# Patient Record
Sex: FEMALE | Race: BLACK OR AFRICAN AMERICAN | Employment: OTHER | URBAN - METROPOLITAN AREA
[De-identification: names, ages, dates, MRNs, and addresses within clinical notes are randomized per-mention and may not be internally consistent; named-entity substitution may affect disease eponyms.]

---

## 2019-08-11 ENCOUNTER — OFFICE VISIT (OUTPATIENT)
Dept: GERIATRIC MEDICINE | Age: 44
End: 2019-08-11
Payer: COMMERCIAL

## 2019-08-11 DIAGNOSIS — M54.50 CHRONIC LOW BACK PAIN WITHOUT SCIATICA, UNSPECIFIED BACK PAIN LATERALITY: Primary | ICD-10-CM

## 2019-08-11 DIAGNOSIS — E03.9 HYPOTHYROIDISM, UNSPECIFIED TYPE: ICD-10-CM

## 2019-08-11 DIAGNOSIS — I10 ESSENTIAL HYPERTENSION: ICD-10-CM

## 2019-08-11 DIAGNOSIS — G89.29 CHRONIC LOW BACK PAIN WITHOUT SCIATICA, UNSPECIFIED BACK PAIN LATERALITY: Primary | ICD-10-CM

## 2019-08-11 DIAGNOSIS — F32.A DEPRESSION, UNSPECIFIED DEPRESSION TYPE: ICD-10-CM

## 2019-08-11 DIAGNOSIS — F41.9 ANXIETY: ICD-10-CM

## 2019-08-11 PROCEDURE — 99306 1ST NF CARE HIGH MDM 50: CPT | Performed by: INTERNAL MEDICINE

## 2019-08-13 ENCOUNTER — OFFICE VISIT (OUTPATIENT)
Dept: GERIATRIC MEDICINE | Age: 44
End: 2019-08-13
Payer: COMMERCIAL

## 2019-08-13 DIAGNOSIS — E03.9 HYPOTHYROIDISM, UNSPECIFIED TYPE: ICD-10-CM

## 2019-08-13 DIAGNOSIS — I10 ESSENTIAL HYPERTENSION: ICD-10-CM

## 2019-08-13 DIAGNOSIS — F32.A DEPRESSION, UNSPECIFIED DEPRESSION TYPE: ICD-10-CM

## 2019-08-13 DIAGNOSIS — G89.29 CHRONIC LOW BACK PAIN WITHOUT SCIATICA, UNSPECIFIED BACK PAIN LATERALITY: Primary | ICD-10-CM

## 2019-08-13 DIAGNOSIS — M54.50 CHRONIC LOW BACK PAIN WITHOUT SCIATICA, UNSPECIFIED BACK PAIN LATERALITY: Primary | ICD-10-CM

## 2019-08-13 DIAGNOSIS — F41.9 ANXIETY: ICD-10-CM

## 2019-08-13 PROCEDURE — 99309 SBSQ NF CARE MODERATE MDM 30: CPT | Performed by: INTERNAL MEDICINE

## 2019-08-19 RX ORDER — OXYCODONE HCL 10 MG/1
10 TABLET, FILM COATED, EXTENDED RELEASE ORAL EVERY 12 HOURS
OUTPATIENT
Start: 2019-08-19

## 2019-08-19 RX ORDER — OXYCODONE HCL 10 MG/1
10 TABLET, FILM COATED, EXTENDED RELEASE ORAL EVERY 12 HOURS
Status: ON HOLD | COMMUNITY
End: 2020-07-04 | Stop reason: ALTCHOICE

## 2019-09-25 ASSESSMENT — ENCOUNTER SYMPTOMS
EYE REDNESS: 0
RHINORRHEA: 0
NAUSEA: 0
NAUSEA: 0
TROUBLE SWALLOWING: 0
COLOR CHANGE: 0
COUGH: 0
EYE PAIN: 0
ABDOMINAL PAIN: 0
EYE DISCHARGE: 0
BLOOD IN STOOL: 0
FACIAL SWELLING: 0
COUGH: 0
SORE THROAT: 0
APNEA: 0
EYE DISCHARGE: 0
WHEEZING: 0
EYE REDNESS: 0
PHOTOPHOBIA: 0
DIARRHEA: 0
PHOTOPHOBIA: 0
SORE THROAT: 0
BACK PAIN: 0
VOMITING: 0
COLOR CHANGE: 0
TROUBLE SWALLOWING: 0
VOICE CHANGE: 0
BACK PAIN: 0
SINUS PRESSURE: 0
APNEA: 0
RHINORRHEA: 0
EYE ITCHING: 0
DIARRHEA: 0
ABDOMINAL DISTENTION: 0
EYE ITCHING: 0
CONSTIPATION: 0
SINUS PRESSURE: 0
EYE PAIN: 0
CHEST TIGHTNESS: 0
FACIAL SWELLING: 0
SINUS PAIN: 0
WHEEZING: 0
RECTAL PAIN: 0
SINUS PAIN: 0
ABDOMINAL PAIN: 0
VOICE CHANGE: 0
VOMITING: 0
BLOOD IN STOOL: 0
CHEST TIGHTNESS: 0
SHORTNESS OF BREATH: 0
CONSTIPATION: 0
SHORTNESS OF BREATH: 0
RECTAL PAIN: 0
ABDOMINAL DISTENTION: 0

## 2019-09-26 NOTE — PROGRESS NOTES
appears well-developed and well-nourished. No distress. HENT:   Head: Normocephalic. Right Ear: External ear normal.   Left Ear: External ear normal.   Eyes: Conjunctivae are normal.   Neck: Neck supple. No tracheal deviation present. Cardiovascular: Normal rate, regular rhythm and normal heart sounds. Pulmonary/Chest: Effort normal and breath sounds normal. No respiratory distress. She has no wheezes. She has no rales. Abdominal: Soft. Bowel sounds are normal. She exhibits no distension and no mass. There is no tenderness. There is no guarding. Musculoskeletal: She exhibits no edema, tenderness or deformity. Neurological: She is alert and oriented to person, place, and time. Bilateral lower extremity weakness 2/5. Skin: Skin is warm and dry. No rash noted. No erythema. No pallor. Psychiatric: She has a normal mood and affect. Judgment and thought content normal.       Assessment:       Diagnosis Orders   1. Chronic low back pain without sciatica, unspecified back pain laterality     2. Essential hypertension     3. Hypothyroidism, unspecified type     4. Depression, unspecified depression type     5. Anxiety           Plan:      Loly Ellsworth was seen today for back pain. Diagnoses and all orders for this visit:    Chronic low back pain without sciatica, unspecified back pain laterality-continue analgesics as described previously. The patient has been referred to pain management. Essential hypertension continue propranolol 20 mg orally 3 times daily. Hypothyroidism, unspecified type continue Synthroid as described previously. Depression, unspecified depression type    Anxiety-continue venlafaxine 150 mg orally daily. Return in about 1 week (around 8/20/2019).       Shukri Wyman MD    Please note, this report has been partially produced using speech recognition software  and may cause  and /or contain errors related to that system including grammar, punctuation and spelling as well as words and phrases that may seem inappropriate. If there are questions or concerns please feel free to contact me to clarify.

## 2019-09-26 NOTE — PROGRESS NOTES
Subjective:      Patient ID: Nadira Arellano is a 44 y.o. female who presents today for:  Chief Complaint   Patient presents with    Back Pain       HPI  41-year-old female with a history of chronic back pain, hypertension, hypothyroidism, depression and anxiety presents to receive nursing home care in a skilled nursing home setting. She reports severe lumbar back pain which is further characterizes sharp constant nonradiating with associated bilateral lower extremity weakness which is also chronic. She states that her pain is aggravated by movement. She reports that her back pain is relieved by opioid analgesics. At present he denies polyuria,  Polydipsia, constitutional, sinus, visual, cardio pulmonary, gastrointestinal, immunological, musculoskeletal, neurologic, hematologic, or psychiatric complaints. No past medical history on file. No past surgical history on file.   Social History     Socioeconomic History    Marital status:      Spouse name: Not on file    Number of children: Not on file    Years of education: Not on file    Highest education level: Not on file   Occupational History    Not on file   Social Needs    Financial resource strain: Not on file    Food insecurity:     Worry: Not on file     Inability: Not on file    Transportation needs:     Medical: Not on file     Non-medical: Not on file   Tobacco Use    Smoking status: Not on file   Substance and Sexual Activity    Alcohol use: Not on file    Drug use: Not on file    Sexual activity: Not on file   Lifestyle    Physical activity:     Days per week: Not on file     Minutes per session: Not on file    Stress: Not on file   Relationships    Social connections:     Talks on phone: Not on file     Gets together: Not on file     Attends Buddhism service: Not on file     Active member of club or organization: Not on file     Attends meetings of clubs or organizations: Not on file     Relationship status: Not on file   [facility]

## 2019-10-14 ENCOUNTER — APPOINTMENT (OUTPATIENT)
Dept: GENERAL RADIOLOGY | Age: 44
End: 2019-10-14
Payer: COMMERCIAL

## 2019-10-14 ENCOUNTER — HOSPITAL ENCOUNTER (EMERGENCY)
Age: 44
Discharge: HOME | End: 2019-10-14
Attending: EMERGENCY MEDICINE
Payer: COMMERCIAL

## 2019-10-14 VITALS
SYSTOLIC BLOOD PRESSURE: 121 MMHG | DIASTOLIC BLOOD PRESSURE: 107 MMHG | WEIGHT: 172 LBS | OXYGEN SATURATION: 100 % | HEART RATE: 82 BPM | RESPIRATION RATE: 16 BRPM | HEIGHT: 63 IN | BODY MASS INDEX: 30.48 KG/M2 | TEMPERATURE: 97.8 F

## 2019-10-14 DIAGNOSIS — S69.91XA INJURY OF FINGER OF RIGHT HAND, INITIAL ENCOUNTER: Primary | ICD-10-CM

## 2019-10-14 PROCEDURE — 73140 X-RAY EXAM OF FINGER(S): CPT

## 2019-10-14 PROCEDURE — 99283 EMERGENCY DEPT VISIT LOW MDM: CPT

## 2019-10-14 RX ORDER — HYDROCODONE BITARTRATE AND ACETAMINOPHEN 5; 325 MG/1; MG/1
1 TABLET ORAL ONCE
Status: DISCONTINUED | OUTPATIENT
Start: 2019-10-14 | End: 2019-10-14

## 2019-10-14 ASSESSMENT — PAIN SCALES - GENERAL
PAINLEVEL_OUTOF10: 10
PAINLEVEL_OUTOF10: 10

## 2020-07-03 ENCOUNTER — HOSPITAL ENCOUNTER (INPATIENT)
Age: 45
LOS: 4 days | Discharge: HOME | DRG: 753 | End: 2020-07-08
Attending: PSYCHIATRY & NEUROLOGY | Admitting: PSYCHIATRY & NEUROLOGY
Payer: COMMERCIAL

## 2020-07-03 LAB
ACETAMINOPHEN LEVEL: <5 UG/ML (ref 10–30)
ALBUMIN SERPL-MCNC: 4.4 G/DL (ref 3.5–4.6)
ALP BLD-CCNC: 39 U/L (ref 40–130)
ALT SERPL-CCNC: 18 U/L (ref 0–33)
AMPHETAMINE SCREEN, URINE: ABNORMAL
ANION GAP SERPL CALCULATED.3IONS-SCNC: 11 MEQ/L (ref 9–15)
AST SERPL-CCNC: 13 U/L (ref 0–35)
BARBITURATE SCREEN URINE: ABNORMAL
BASOPHILS ABSOLUTE: 0 K/UL (ref 0–0.2)
BASOPHILS RELATIVE PERCENT: 0.5 %
BENZODIAZEPINE SCREEN, URINE: ABNORMAL
BILIRUB SERPL-MCNC: <0.2 MG/DL (ref 0.2–0.7)
BILIRUBIN URINE: NEGATIVE
BLOOD, URINE: NEGATIVE
BUN BLDV-MCNC: 11 MG/DL (ref 6–20)
CALCIUM SERPL-MCNC: 8.9 MG/DL (ref 8.5–9.9)
CANNABINOID SCREEN URINE: ABNORMAL
CHLORIDE BLD-SCNC: 102 MEQ/L (ref 95–107)
CHOLESTEROL, TOTAL: 170 MG/DL (ref 0–199)
CLARITY: CLEAR
CO2: 24 MEQ/L (ref 20–31)
COCAINE METABOLITE SCREEN URINE: POSITIVE
COLOR: YELLOW
CREAT SERPL-MCNC: 0.95 MG/DL (ref 0.5–0.9)
EOSINOPHILS ABSOLUTE: 0.4 K/UL (ref 0–0.7)
EOSINOPHILS RELATIVE PERCENT: 4.6 %
ETHANOL PERCENT: 0.16 G/DL
ETHANOL: 181 MG/DL (ref 0–0.08)
GFR AFRICAN AMERICAN: >60
GFR NON-AFRICAN AMERICAN: >60
GLOBULIN: 3.2 G/DL (ref 2.3–3.5)
GLUCOSE BLD-MCNC: 110 MG/DL (ref 70–99)
GLUCOSE URINE: NEGATIVE MG/DL
HCG(URINE) PREGNANCY TEST: NEGATIVE
HCT VFR BLD CALC: 37.5 % (ref 37–47)
HDLC SERPL-MCNC: 68 MG/DL (ref 40–59)
HEMOGLOBIN: 12.6 G/DL (ref 12–16)
KETONES, URINE: NEGATIVE MG/DL
LDL CHOLESTEROL CALCULATED: 82 MG/DL (ref 0–129)
LEUKOCYTE ESTERASE, URINE: NEGATIVE
LYMPHOCYTES ABSOLUTE: 2.5 K/UL (ref 1–4.8)
LYMPHOCYTES RELATIVE PERCENT: 31.6 %
Lab: ABNORMAL
MCH RBC QN AUTO: 28.1 PG (ref 27–31.3)
MCHC RBC AUTO-ENTMCNC: 33.6 % (ref 33–37)
MCV RBC AUTO: 83.5 FL (ref 82–100)
METHADONE SCREEN, URINE: ABNORMAL
MONOCYTES ABSOLUTE: 0.8 K/UL (ref 0.2–0.8)
MONOCYTES RELATIVE PERCENT: 9.6 %
NEUTROPHILS ABSOLUTE: 4.2 K/UL (ref 1.4–6.5)
NEUTROPHILS RELATIVE PERCENT: 53.7 %
NITRITE, URINE: NEGATIVE
OPIATE SCREEN URINE: ABNORMAL
OXYCODONE URINE: ABNORMAL
PDW BLD-RTO: 14.7 % (ref 11.5–14.5)
PH UA: 6.5 (ref 5–9)
PHENCYCLIDINE SCREEN URINE: ABNORMAL
PLATELET # BLD: 367 K/UL (ref 130–400)
POTASSIUM SERPL-SCNC: 3.8 MEQ/L (ref 3.4–4.9)
PROPOXYPHENE SCREEN: ABNORMAL
PROTEIN UA: NEGATIVE MG/DL
RBC # BLD: 4.49 M/UL (ref 4.2–5.4)
SALICYLATE, SERUM: <0.3 MG/DL (ref 15–30)
SODIUM BLD-SCNC: 137 MEQ/L (ref 135–144)
SPECIFIC GRAVITY UA: 1 (ref 1–1.03)
TOTAL CK: 66 U/L (ref 0–170)
TOTAL PROTEIN: 7.6 G/DL (ref 6.3–8)
TRIGL SERPL-MCNC: 102 MG/DL (ref 0–150)
TSH SERPL DL<=0.05 MIU/L-ACNC: 4.62 UIU/ML (ref 0.44–3.86)
URINE REFLEX TO CULTURE: NORMAL
UROBILINOGEN, URINE: 0.2 E.U./DL
WBC # BLD: 7.8 K/UL (ref 4.8–10.8)

## 2020-07-03 PROCEDURE — 84443 ASSAY THYROID STIM HORMONE: CPT

## 2020-07-03 PROCEDURE — 80307 DRUG TEST PRSMV CHEM ANLYZR: CPT

## 2020-07-03 PROCEDURE — G0480 DRUG TEST DEF 1-7 CLASSES: HCPCS

## 2020-07-03 PROCEDURE — 81003 URINALYSIS AUTO W/O SCOPE: CPT

## 2020-07-03 PROCEDURE — 85025 COMPLETE CBC W/AUTO DIFF WBC: CPT

## 2020-07-03 PROCEDURE — 80053 COMPREHEN METABOLIC PANEL: CPT

## 2020-07-03 PROCEDURE — 82550 ASSAY OF CK (CPK): CPT

## 2020-07-03 PROCEDURE — 84703 CHORIONIC GONADOTROPIN ASSAY: CPT

## 2020-07-03 PROCEDURE — 80061 LIPID PANEL: CPT

## 2020-07-03 PROCEDURE — 36415 COLL VENOUS BLD VENIPUNCTURE: CPT

## 2020-07-03 PROCEDURE — 99285 EMERGENCY DEPT VISIT HI MDM: CPT

## 2020-07-03 ASSESSMENT — PATIENT HEALTH QUESTIONNAIRE - PHQ9: SUM OF ALL RESPONSES TO PHQ QUESTIONS 1-9: 15

## 2020-07-03 ASSESSMENT — ENCOUNTER SYMPTOMS
ABDOMINAL PAIN: 0
TROUBLE SWALLOWING: 0
EYE PAIN: 0
ALLERGIC/IMMUNOLOGIC NEGATIVE: 1
COLOR CHANGE: 0
APNEA: 0
SHORTNESS OF BREATH: 0

## 2020-07-04 PROBLEM — F32.2 SEVERE MAJOR DEPRESSION WITHOUT PSYCHOTIC FEATURES (HCC): Status: ACTIVE | Noted: 2020-07-04

## 2020-07-04 PROBLEM — F31.60 MIXED BIPOLAR AFFECTIVE DISORDER (HCC): Status: ACTIVE | Noted: 2020-07-04

## 2020-07-04 LAB
ETHANOL PERCENT: 0.07 G/DL
ETHANOL: 81 MG/DL (ref 0–0.08)
SARS-COV-2, NAAT: NOT DETECTED

## 2020-07-04 PROCEDURE — 1240000000 HC EMOTIONAL WELLNESS R&B

## 2020-07-04 PROCEDURE — 6370000000 HC RX 637 (ALT 250 FOR IP): Performed by: EMERGENCY MEDICINE

## 2020-07-04 PROCEDURE — U0002 COVID-19 LAB TEST NON-CDC: HCPCS

## 2020-07-04 PROCEDURE — G0480 DRUG TEST DEF 1-7 CLASSES: HCPCS

## 2020-07-04 PROCEDURE — 6370000000 HC RX 637 (ALT 250 FOR IP): Performed by: PSYCHIATRY & NEUROLOGY

## 2020-07-04 PROCEDURE — 36415 COLL VENOUS BLD VENIPUNCTURE: CPT

## 2020-07-04 PROCEDURE — 6370000000 HC RX 637 (ALT 250 FOR IP): Performed by: HOSPITALIST

## 2020-07-04 RX ORDER — VENLAFAXINE 75 MG/1
75 TABLET ORAL DAILY
Status: ON HOLD | COMMUNITY
End: 2020-07-08 | Stop reason: HOSPADM

## 2020-07-04 RX ORDER — ZOLPIDEM TARTRATE 10 MG/1
10 TABLET ORAL NIGHTLY PRN
COMMUNITY

## 2020-07-04 RX ORDER — IBUPROFEN 800 MG/1
800 TABLET ORAL 2 TIMES DAILY
Status: ON HOLD | COMMUNITY
End: 2020-07-08 | Stop reason: HOSPADM

## 2020-07-04 RX ORDER — LEVOTHYROXINE SODIUM 137 UG/1
150 TABLET ORAL
COMMUNITY

## 2020-07-04 RX ORDER — BENZTROPINE MESYLATE 1 MG/ML
2 INJECTION INTRAMUSCULAR; INTRAVENOUS 2 TIMES DAILY PRN
Status: DISCONTINUED | OUTPATIENT
Start: 2020-07-04 | End: 2020-07-08 | Stop reason: HOSPADM

## 2020-07-04 RX ORDER — ACETAMINOPHEN 325 MG/1
650 TABLET ORAL EVERY 4 HOURS PRN
Status: DISCONTINUED | OUTPATIENT
Start: 2020-07-04 | End: 2020-07-08 | Stop reason: HOSPADM

## 2020-07-04 RX ORDER — CYCLOBENZAPRINE HCL 10 MG
10 TABLET ORAL NIGHTLY
COMMUNITY

## 2020-07-04 RX ORDER — POLYETHYLENE GLYCOL 3350 17 G
2 POWDER IN PACKET (EA) ORAL
Status: DISCONTINUED | OUTPATIENT
Start: 2020-07-04 | End: 2020-07-08 | Stop reason: HOSPADM

## 2020-07-04 RX ORDER — CYCLOBENZAPRINE HCL 10 MG
10 TABLET ORAL NIGHTLY
Status: DISCONTINUED | OUTPATIENT
Start: 2020-07-04 | End: 2020-07-08 | Stop reason: HOSPADM

## 2020-07-04 RX ORDER — TRAZODONE HYDROCHLORIDE 50 MG/1
50 TABLET ORAL NIGHTLY PRN
Status: DISCONTINUED | OUTPATIENT
Start: 2020-07-04 | End: 2020-07-07

## 2020-07-04 RX ORDER — CLONAZEPAM 0.5 MG/1
0.5 TABLET ORAL 3 TIMES DAILY
Status: DISCONTINUED | OUTPATIENT
Start: 2020-07-04 | End: 2020-07-08 | Stop reason: HOSPADM

## 2020-07-04 RX ORDER — PROPRANOLOL HYDROCHLORIDE 20 MG/1
20 TABLET ORAL 3 TIMES DAILY
Status: DISCONTINUED | OUTPATIENT
Start: 2020-07-04 | End: 2020-07-08 | Stop reason: HOSPADM

## 2020-07-04 RX ORDER — HALOPERIDOL 5 MG/ML
5 INJECTION INTRAMUSCULAR EVERY 6 HOURS PRN
Status: DISCONTINUED | OUTPATIENT
Start: 2020-07-04 | End: 2020-07-08 | Stop reason: HOSPADM

## 2020-07-04 RX ORDER — ALPRAZOLAM 1 MG/1
2 TABLET ORAL 4 TIMES DAILY
COMMUNITY

## 2020-07-04 RX ORDER — HYDROXYZINE PAMOATE 50 MG/1
50 CAPSULE ORAL EVERY 6 HOURS PRN
Status: DISCONTINUED | OUTPATIENT
Start: 2020-07-04 | End: 2020-07-08 | Stop reason: HOSPADM

## 2020-07-04 RX ORDER — QUETIAPINE FUMARATE 50 MG/1
50 TABLET, FILM COATED ORAL NIGHTLY
Status: DISCONTINUED | OUTPATIENT
Start: 2020-07-04 | End: 2020-07-05

## 2020-07-04 RX ORDER — GABAPENTIN 300 MG/1
300 CAPSULE ORAL 3 TIMES DAILY
COMMUNITY

## 2020-07-04 RX ORDER — IBUPROFEN 800 MG/1
800 TABLET ORAL 2 TIMES DAILY
Status: DISCONTINUED | OUTPATIENT
Start: 2020-07-04 | End: 2020-07-08 | Stop reason: HOSPADM

## 2020-07-04 RX ORDER — SUCRALFATE 1 G/1
1 TABLET ORAL 4 TIMES DAILY
Status: DISCONTINUED | OUTPATIENT
Start: 2020-07-04 | End: 2020-07-08 | Stop reason: HOSPADM

## 2020-07-04 RX ORDER — OXCARBAZEPINE 300 MG/1
600 TABLET, FILM COATED ORAL NIGHTLY
Status: ON HOLD | COMMUNITY
End: 2020-07-08 | Stop reason: HOSPADM

## 2020-07-04 RX ORDER — HYDROXYZINE HYDROCHLORIDE 50 MG/ML
50 INJECTION, SOLUTION INTRAMUSCULAR EVERY 6 HOURS PRN
Status: DISCONTINUED | OUTPATIENT
Start: 2020-07-04 | End: 2020-07-08 | Stop reason: HOSPADM

## 2020-07-04 RX ORDER — SUCRALFATE 1 G/1
1 TABLET ORAL 4 TIMES DAILY
COMMUNITY

## 2020-07-04 RX ORDER — OXCARBAZEPINE 300 MG/1
300 TABLET, FILM COATED ORAL 2 TIMES DAILY
COMMUNITY

## 2020-07-04 RX ORDER — OXCARBAZEPINE 300 MG/1
300 TABLET, FILM COATED ORAL 2 TIMES DAILY
Status: DISCONTINUED | OUTPATIENT
Start: 2020-07-04 | End: 2020-07-08 | Stop reason: HOSPADM

## 2020-07-04 RX ORDER — GABAPENTIN 300 MG/1
300 CAPSULE ORAL 3 TIMES DAILY
Status: DISCONTINUED | OUTPATIENT
Start: 2020-07-04 | End: 2020-07-08 | Stop reason: HOSPADM

## 2020-07-04 RX ORDER — IBUPROFEN 800 MG/1
800 TABLET ORAL ONCE
Status: COMPLETED | OUTPATIENT
Start: 2020-07-04 | End: 2020-07-04

## 2020-07-04 RX ORDER — PROPRANOLOL HYDROCHLORIDE 20 MG/1
20 TABLET ORAL 3 TIMES DAILY
COMMUNITY

## 2020-07-04 RX ORDER — OMEPRAZOLE 20 MG/1
40 CAPSULE, DELAYED RELEASE ORAL DAILY
COMMUNITY

## 2020-07-04 RX ORDER — HALOPERIDOL 5 MG
5 TABLET ORAL EVERY 6 HOURS PRN
Status: DISCONTINUED | OUTPATIENT
Start: 2020-07-04 | End: 2020-07-08 | Stop reason: HOSPADM

## 2020-07-04 RX ORDER — PANTOPRAZOLE SODIUM 40 MG/1
40 TABLET, DELAYED RELEASE ORAL
Status: DISCONTINUED | OUTPATIENT
Start: 2020-07-05 | End: 2020-07-08 | Stop reason: HOSPADM

## 2020-07-04 RX ADMIN — IBUPROFEN 800 MG: 800 TABLET ORAL at 21:41

## 2020-07-04 RX ADMIN — TRAZODONE HYDROCHLORIDE 50 MG: 50 TABLET ORAL at 21:06

## 2020-07-04 RX ADMIN — HYDROXYZINE PAMOATE 50 MG: 50 CAPSULE ORAL at 10:33

## 2020-07-04 RX ADMIN — IBUPROFEN 800 MG: 800 TABLET, FILM COATED ORAL at 02:19

## 2020-07-04 RX ADMIN — CYCLOBENZAPRINE 10 MG: 10 TABLET, FILM COATED ORAL at 21:41

## 2020-07-04 RX ADMIN — ACETAMINOPHEN 650 MG: 325 TABLET, FILM COATED ORAL at 17:21

## 2020-07-04 RX ADMIN — CLONAZEPAM 0.5 MG: 0.5 TABLET ORAL at 21:07

## 2020-07-04 RX ADMIN — ACETAMINOPHEN 650 MG: 325 TABLET, FILM COATED ORAL at 10:33

## 2020-07-04 RX ADMIN — GABAPENTIN 300 MG: 300 CAPSULE ORAL at 21:42

## 2020-07-04 RX ADMIN — OXCARBAZEPINE 300 MG: 300 TABLET, FILM COATED ORAL at 21:07

## 2020-07-04 RX ADMIN — PROPRANOLOL HYDROCHLORIDE 20 MG: 20 TABLET ORAL at 21:41

## 2020-07-04 RX ADMIN — HYDROXYZINE PAMOATE 50 MG: 50 CAPSULE ORAL at 17:21

## 2020-07-04 RX ADMIN — SUCRALFATE 1 G: 1 TABLET ORAL at 21:41

## 2020-07-04 ASSESSMENT — SLEEP AND FATIGUE QUESTIONNAIRES
DO YOU USE A SLEEP AID: YES
DIFFICULTY ARISING: NO
SLEEP PATTERN: DISTURBED/INTERRUPTED SLEEP
DIFFICULTY FALLING ASLEEP: YES
DO YOU HAVE DIFFICULTY SLEEPING: YES
DIFFICULTY STAYING ASLEEP: YES
AVERAGE NUMBER OF SLEEP HOURS: 6
RESTFUL SLEEP: NO

## 2020-07-04 ASSESSMENT — PATIENT HEALTH QUESTIONNAIRE - PHQ9: SUM OF ALL RESPONSES TO PHQ QUESTIONS 1-9: 16

## 2020-07-04 ASSESSMENT — PAIN SCALES - GENERAL
PAINLEVEL_OUTOF10: 6
PAINLEVEL_OUTOF10: 5
PAINLEVEL_OUTOF10: 10
PAINLEVEL_OUTOF10: 7
PAINLEVEL_OUTOF10: 10

## 2020-07-04 ASSESSMENT — LIFESTYLE VARIABLES: HISTORY_ALCOHOL_USE: YES

## 2020-07-04 NOTE — GROUP NOTE
Group Therapy Note    Date: 7/4/2020    Group Start Time: 1815  Group End Time: 1905  Group Topic: Recreational    [facility]    Ailyn Jones        Group Therapy Note    Attendees: 7/20       Patient's Goal:  To participate in the [employer] Activity [employer]'s game of [address] [employer]. Notes:  Patient actively participated in the [employer] Activity Group. Status After Intervention:  Improved    Participation Level:  Active Listener and Interactive    Participation Quality: Appropriate, Attentive and Supportive      Speech:  normal      Thought Process/Content: Logical      Affective Functioning: Congruent      Mood: euthymic      Level of consciousness:  Alert and Attentive      Response to Learning: Able to verbalize current knowledge/experience      Endings: None Reported    Modes of Intervention: Activity      Discipline Responsible: [address], [employer]      Signature:  Ailyn Jones
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
## 2020-07-04 NOTE — H&P
[address] [facility]    HISTORY AND PHYSICAL EXAM    PATIENT NAME:  Damaso Ingram    MRN:  [id number]  SERVICE DATE:  7/4/2020   SERVICE TIME:  5:11 PM    Primary Care Physician: Caro Starks  CHIEF COMPLAINT:  Medical clear for inpatient psychiatry admission. Consult for medical H/P encounter. HPI:  This is a 38 y.o. female who is admitted to [address] [place] [address] for worsening symptoms of depression with thoughts of suicidal ideation immediately pta. Denies cp sob ha rash n v d f    PAST MEDICAL HISTORY:  No past medical history on file. PAST SURGICAL HISTORY:  No past surgical history on file. FAMILY HISTORY:  No family history on file.   SOCIAL HISTORY:    Social History     Socioeconomic History    Marital status:      Spouse name: Not on file    Number of children: Not on file    Years of education: Not on file    Highest education level: Not on file   Occupational History    Not on file   Social Needs    Financial resource strain: Not on file    Food insecurity     Worry: Not on file     Inability: Not on file    Transportation needs     Medical: Not on file     Non-medical: Not on file   Tobacco Use    Smoking status: Not on file   Substance and Sexual Activity    Alcohol use: Not Currently    Drug use: Not on file    Sexual activity: Not on file   Lifestyle    Physical activity     Days per week: Not on file     Minutes per session: Not on file    Stress: Not on file   Relationships    Social connections     Talks on phone: Not on file     Gets together: Not on file     Attends Christian service: Not on file     Active member of club or organization: Not on file     Attends meetings of clubs or organizations: Not on file     Relationship status: Not on file    Intimate partner violence     Fear of current or ex partner: Not on file     Emotionally abused: Not on file     Physically abused: Not on file     Forced sexual activity: Not on file Other Topics Concern    Not on file   Social History Narrative    Not on file     MEDICATIONS:    Current Facility-Administered Medications   Medication Dose Route Frequency Provider Last Rate Last Dose    haloperidol lactate (HALDOL) injection 5 mg  5 mg Intramuscular Q6H PRN Renny De León MD        Or    haloperidol (HALDOL) tablet 5 mg  5 mg Oral Q6H PRN Renny De León MD        hydrOXYzine (VISTARIL) capsule 50 mg  50 mg Oral Q6H PRN Renny De León MD   50 mg at 07/04/20 1033    Or    hydrOXYzine (VISTARIL) injection 50 mg  50 mg Intramuscular Q6H PRN Renny De León MD        acetaminophen (TYLENOL) tablet 650 mg  650 mg Oral Q4H PRN Renny De León MD   650 mg at 07/04/20 1033    traZODone (DESYREL) tablet 50 mg  50 mg Oral Nightly PRN Lesly Lopes MD        benztropine mesylate (COGENTIN) injection 2 mg  2 mg Intramuscular BID PRN Renny De León MD        magnesium hydroxide (MILK OF MAGNESIA) 400 MG/5ML suspension 30 mL  30 mL Oral Daily PRN Lesly Lopes MD        nicotine polacrilex (COMMIT) lozenge 2 mg  2 mg Oral Q2H PRN Renny De León MD           ALLERGIES: Meloxicam    REVIEW OF SYSTEM:   ROS as noted in HPI, 12 point ROS reviewed and otherwise negative. OBJECTIVE  PHYSICAL EXAM: /79   Pulse 91   Temp 98.8 °F (37.1 °C) (Oral)   Resp 20   Ht 5' 3\" (1.6 m)   Wt 176 lb (79.8 kg)   SpO2 99%   BMI 31.18 kg/m²   CONSTITUTIONAL:  awake, alert, cooperative, no apparent distress, and appears stated age  EYES:  Lids and lashes normal, pupils equal, round and reactive to light, extra ocular muscles intact, sclera clear, conjunctiva normal  ENT:  Normocephalic, without obvious abnormality, atraumatic, sinuses nontender on palpation, external ears without lesions, oral pharynx with moist mucus membranes, tonsils without erythema or exudates, gums normal and good dentition.   NECK:  Supple, symmetrical, trachea midline, no adenopathy, thyroid symmetric, not enlarged and no tenderness, skin normal  LUNGS:  No increased work of breathing, good air exchange, clear to auscultation bilaterally, no crackles or wheezing  CARDIOVASCULAR:  Normal apical impulse, regular rate and rhythm, normal S1 and S2, no S3 or S4, and no murmur noted  ABDOMEN:  No scars, normal bowel sounds, soft, non-distended, non-tender, no masses palpated, no hepatosplenomegally  MUSCULOSKELETAL:  There is no redness, warmth, or swelling of the joints. Full range of motion noted. Motor strength is 5 out of 5 all extremities bilaterally. Tone is normal.  NEUROLOGIC:  Awake, alert, oriented to name, place and time. Cranial nerves II-XII are grossly intact. Motor is 5 out of 5 bilaterally. Cerebellar finger to nose, heel to shin intact. Sensory is intact. Babinski down going, Romberg negative, and gait is normal.  SKIN:  no bruising or bleeding, normal skin color, texture, turgor, no redness, warmth, or swelling and no jaundice    DATA:     Diagnostic tests reviewed for today's visit:    Most recent labs and imaging results reviewed. VTE Prophylaxis:     ASSESSMENT AND PLAN  Active Problems:    Severe major depression without psychotic features ([address]  Plan:   Suicidal ideation  ETOH abuse  Cocaine abuse  GALA      This is only a history and physical examination and not medical management. The patient is to contact and follow up with their primary care physician and go over any abnormal labs, imaging, findings, medical concerns, or conditions that we have and have not addressed during this encounter.     Plan of care discussed with: patient    SIGNATURE: SAMIRA Beltrán  DATE: July 4, 2020  TIME: 5:11 PM

## 2020-07-04 NOTE — CARE COORDINATION
Patient did not attend psychotherapy group despite encouragement. Patient will be re attempted and encouraged for attendance for next group.
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
## 2020-07-04 NOTE — PROGRESS NOTES
Pt was laying in bed awake and alert. Pt stated she is depressed from all of her medical problems and has chronic pain. Pt states she was drinking alcohol and was pulled over by the police. Pt was medicated with tylenol and vistaril. Pt had pressured speech and appeared anxious during assessment. Pt denies SI,HI,AVH.

## 2020-07-04 NOTE — PROGRESS NOTES
Patient was laying in bed in her room. She was awake and alert. She was anxious, talkative and expressive. Patient stated she is depressed, overwhelmed and stressed. Stressors are she has a lot of medical issues, has chronic pain, misses her kids that live in another state and she had 2 kids that . She was drinking alcohol and got pulled over by then police for an [id number]. Once patient was arrested, she started to say she wanted to kill herself. Patient denies she was suicidal.  She feels tired all the time and has low motivation. She denies any auditory or visual hallucinations. She has no leisure interests at this time. Electronically signed by Rishabh Belcher, [address] on 2020 at 4:08 PM
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
## 2020-07-04 NOTE — PROGRESS NOTES
Report from [facility]  MED CENTER in [facility] AN [facility]FFILIATE OF [facility]-    38year old female presented to ED via [place] [place] [employer]. Police state they had pulled pt over for an STEVIE and once she was arrested in the back of the squad car, she started to say she wanted to kill herself so [employer] brought her in for eval. Patient initially presented intoxicated, and was adamant she would kill herself \"by any means necessary\" if she was discharged.     Once sober upon reassessment, patient denies current SI but states her depression lately has been \"horrible\". She reports feeling constantly depressed, worthless, and unable to focus. She states she feels tired all the time and cant find pleasure in any activities. She reports her main issues are her chronic medical conditions, mostly related to shoulder and back issues. She states the pain isn't managed well.     + cocaine which pt states is from her lidocaine patch. Pt states she isn't a regular drinker, and reports this is the 6th time in 20 years she's been drunk. Pt denies HI or AV hallucinations. Dx Major Depression, voluntary admission, Dr. Kelly Acevedo. Meds need verified with [facility] when opened.

## 2020-07-04 NOTE — ED NOTES
Provisional Diagnosis:    Major Depression    Psychosocial and Contextual Factors:    Not working, on SSI due to medical    C-SSRS Summary:     Patient: C-SSRS Suicide Screening  1) Within the past month, have you wished you were dead or wished you could go to sleep and not wake up? : Yes  2) Have you actually had any thoughts of killing yourself? : No  6) Have you ever done anything, started to do anything, or prepared to do anything to end your life?: No    Family: none    Agency: [facility]          Abuse Assessment  Physical Abuse: Denies  Verbal Abuse: Denies  Emotional abuse: Denies  Financial Abuse: Denies  Sexual abuse: Denies  Elder abuse: No    Clinical Summary:    38year old female presented to ED via [email or web address]. They state they had pulled her over for an STEVIE, and once she was arrested on in the back of the squad car, she started to say she wanted to kill herself, so [employer] brought her in for eval. Patient initially presented intoxicated, and was adamant she would kill herself \"by any means necessary\" if she was discharged. Once sober, on reassessment, patient denies current SI, but states her depression lately has been \"horrible\". She reports feeling constantantly depressed worthless, and unable to focus. She states she feels tired all the time, and cant find pleasure in any activities. She reports her main issues are her chronic medical conditions, mostly related to shoulder and back issues. She states they pain isnt managed well, and she feels like she is having to constantly either at a doctors or in the hospital.    She states she isn't a regular drinker, and reports this is the 6th time in 20 years she's been drunk. She reports no issues with homicidal ideations, or A/V hallucinations, and no history of either.     Level of Care Disposition:      Per Dr Amber Alvarado, RN  07/04/20 0310

## 2020-07-04 NOTE — ED NOTES
Per Dr Vladislav Yeager ok to admit. Day shift to verify meds when [facility] opens. Standard PRNs.      Cornell Ceballos RN  07/04/20 2739

## 2020-07-04 NOTE — PROGRESS NOTES
Pt. refused to attend the 1000 skills group, despite staff encouragement. Electronically signed by Will Mireles, [address] on 7/4/2020 at 11:21 AM

## 2020-07-04 NOTE — ED NOTES
[place] [employer] [place] [address] [place] [address].      Frank Titus RN  07/03/20 2010

## 2020-07-04 NOTE — ED PROVIDER NOTES
[address] [facility] ED  eMERGENCY dEPARTMENTeNCOUnter      Pt Name: Camelia Grigsby  MRN: [id number]  [place] 1975  Date ofevaluation: 7/3/2020  Provider: Eller Kanner, MD    CHIEF COMPLAINT       Chief Complaint   Patient presents with   [facility] Psychiatric Evaluation     pt brought to the ER for a eval         HISTORY OF PRESENT ILLNESS   (Location/Symptom, Timing/Onset,Context/Setting, Quality, Duration, Modifying Factors, Severity)  Note limiting factors. Camelia Grigsby is a 38 y.o. female who presents to the emergency department with complaints of depression and feelings of hopelessness and helplessness, stating that she wished that she could \"go to sleep and never wake up again\". Patient was pulled over by police after consuming 3 shots this evening and voiced her depression and suicidal ideation to the police officers who brought her to the emergency department. Patient denies any plan of suicide. Patient when asked if she has any homicidal ideation, states \"yes towards everyone who ever done the wrong\". Patient denies any recent auditory or visual hallucinations. HPI    NursingNotes were reviewed. REVIEW OF SYSTEMS    (2-9 systems for level 4, 10 or more for level 5)     Review of Systems   Constitutional: Negative for diaphoresis and fever. HENT: Negative for hearing loss and trouble swallowing. Eyes: Negative for pain. Respiratory: Negative for apnea and shortness of breath. Cardiovascular: Negative for chest pain. Gastrointestinal: Negative for abdominal pain. Endocrine: Negative. Genitourinary: Negative for hematuria. Musculoskeletal: Negative for neck pain and neck stiffness. Skin: Negative for color change. Allergic/Immunologic: Negative. Neurological: Negative for dizziness and numbness. Hematological: Negative. Psychiatric/Behavioral: Positive for suicidal ideas. The patient is nervous/anxious.     All other systems reviewed and are negative. Except as noted above the remainder of the review of systems was reviewed and negative. PAST MEDICAL HISTORY   No past medical history on file. SURGICALHISTORY     No past surgical history on file. CURRENT MEDICATIONS       Previous Medications    OXYCODONE (OXYCONTIN) 10 MG EXTENDED RELEASE TABLET    Take 10 mg by mouth every 12 hours. ALLERGIES     Meloxicam    FAMILY HISTORY     No family history on file.        SOCIAL HISTORY       Social History     Socioeconomic History    Marital status:      Spouse name: Not on file    Number of children: Not on file    Years of education: Not on file    Highest education level: Not on file   Occupational History    Not on file   Social Needs    Financial resource strain: Not on file    Food insecurity     Worry: Not on file     Inability: Not on file    Transportation needs     Medical: Not on file     Non-medical: Not on file   Tobacco Use    Smoking status: Not on file   Substance and Sexual Activity    Alcohol use: Not Currently    Drug use: Not on file    Sexual activity: Not on file   Lifestyle    Physical activity     Days per week: Not on file     Minutes per session: Not on file    Stress: Not on file   Relationships    Social connections     Talks on phone: Not on file     Gets together: Not on file     Attends Yazdanism service: Not on file     Active member of club or organization: Not on file     Attends meetings of clubs or organizations: Not on file     Relationship status: Not on file    Intimate partner violence     Fear of current or ex partner: Not on file     Emotionally abused: Not on file     Physically abused: Not on file     Forced sexual activity: Not on file   Other Topics Concern    Not on file   Social History Narrative    Not on file       SCREENINGS              PHYSICAL EXAM    (up to 7 for level 4, 8 or more for level 5)     ED Triage Vitals [07/03/20 1959]   BP Temp Temp Source Pulse Resp SpO2 Height Weight   (!) 143/73 98.1 °F (36.7 °C) Oral 105 20 96 % 5' 3\" (1.6 m) 176 lb (79.8 kg)       Physical Exam  Vitals signs and nursing note reviewed. Constitutional:       General: She is not in acute distress. Appearance: She is well-developed. She is not diaphoretic. HENT:      Head: Normocephalic and atraumatic. Mouth/Throat:      Pharynx: No oropharyngeal exudate. Eyes:      General: No scleral icterus. Conjunctiva/sclera: Conjunctivae normal.      Pupils: Pupils are equal, round, and reactive to light. Neck:      Musculoskeletal: Normal range of motion and neck supple. Trachea: No tracheal deviation. Cardiovascular:      Rate and Rhythm: Normal rate. Heart sounds: Normal heart sounds. Pulmonary:      Effort: Pulmonary effort is normal. No respiratory distress. Breath sounds: Normal breath sounds. Abdominal:      General: Bowel sounds are normal. There is no distension. Palpations: Abdomen is soft. Musculoskeletal: Normal range of motion. Skin:     General: Skin is warm and dry. Findings: No erythema or rash. Neurological:      Mental Status: She is alert and oriented to person, place, and time. Cranial Nerves: No cranial nerve deficit. Motor: No abnormal muscle tone. Psychiatric:         Mood and Affect: Mood is depressed. Affect is tearful. Behavior: Behavior normal.         Thought Content:  Thought content normal.         Judgment: Judgment normal.         DIAGNOSTIC RESULTS     EKG: All EKG's are interpreted by the Emergency Department Physician who either signs or Co-signsthis chart in the absence of a cardiologist.      RADIOLOGY:   Non-plain filmimages such as CT, Ultrasound and MRI are read by the radiologist. Plain radiographic images are visualized and preliminarily interpreted by the emergency physician with the below findings:      Interpretation per the Radiologist below, if available at the time ofthis note:    No orders to display         ED BEDSIDE ULTRASOUND:   Performed by ED Physician - none    LABS:  Labs Reviewed   ACETAMINOPHEN LEVEL - Abnormal; Notable for the following components:       Result Value    Acetaminophen Level <5 (*)     All other components within normal limits   CBC WITH AUTO DIFFERENTIAL - Abnormal; Notable for the following components:    RDW 14.7 (*)     All other components within normal limits   COMPREHENSIVE METABOLIC PANEL - Abnormal; Notable for the following components:    Glucose 110 (*)     CREATININE 0.95 (*)     Alkaline Phosphatase 39 (*)     All other components within normal limits   LIPID PANEL - Abnormal; Notable for the following components:    HDL 68 (*)     All other components within normal limits   SALICYLATE LEVEL - Abnormal; Notable for the following components:    Salicylate, Serum <1.9 (*)     All other components within normal limits   TSH WITHOUT REFLEX - Abnormal; Notable for the following components:    TSH 4.620 (*)     All other components within normal limits   URINE DRUG SCREEN - Abnormal; Notable for the following components:    Cocaine Metabolite Screen, Urine POSITIVE (*)     All other components within normal limits   CK   ETHANOL   PREGNANCY, URINE   URINE RT REFLEX TO CULTURE   COVID-19   ETHANOL       All other labs were within normal range or not returned as of this dictation. EMERGENCY DEPARTMENT COURSE and DIFFERENTIAL DIAGNOSIS/MDM:   Vitals:    Vitals:    07/03/20 1959   BP: (!) 143/73   Pulse: 105   Resp: 20   Temp: 98.1 °F (36.7 °C)   TempSrc: Oral   SpO2: 96%   Weight: 176 lb (79.8 kg)   Height: 5' 3\" (1.6 m)         MDM patient is medically cleared. REASSESSMENT            CONSULTS:  None    PROCEDURES:  Unless otherwise noted below, none     Procedures    FINAL IMPRESSION      1.  Current moderate episode of major depressive disorder without prior episode [facility])          DISPOSITION/PLAN   DISPOSITION Decision To Admit 07/04/2020 05:47:13 AM      PATIENT REFERREDTO:  No follow-up provider specified. DISCHARGEMEDICATIONS:  New Prescriptions    No medications on file     Controlled Substances Monitoring:     No flowsheet data found.     (Please note that portions of this note were completed with a voice recognition program.  Efforts were made to edit the dictations but occasionally words are mis-transcribed.)    Amanda Reece MD (electronically signed)  Attending Emergency Physician          Amanda Reece MD  07/04/20 5209

## 2020-07-04 NOTE — ED NOTES
Report received from [address] [place] [address] . On phone on arrival. Returned to bed. Aware awaiting [address] [facility] [address] bed. Ed [facility]  07/04/20 1941

## 2020-07-04 NOTE — GROUP NOTE
Group Therapy Note    Date: 7/4/2020    Group Start Time: 3830  Group End Time: 1700  Group Topic: Healthy Living/Wellness    [facility] EPIFANIO Viera        Group Therapy Note    Attendees: 9/20       Patient's Goal:  To learn about self-reflection and how to turn negative thinking patterns into positive, constructive thoughts. Notes:  Patient hesitantly participated in the [address] [facility],[address]. Patient stated she \"did not want to be here\" (in group) but knows that she \"needs to go to group in order to get discharged. \" Patient was resistant to participation and minimally participated due to this resistance.     Status After Intervention:  Unchanged    Participation Level: Minimal    Participation Quality: Attentive, Sharing and Resistant      Speech:  normal      Thought Process/Content: Logical      Affective Functioning: Constricted/Restricted      Mood: depressed and irritable      Level of consciousness:  Alert and Attentive      Response to Learning: Resistant      Endings: None Reported    Modes of Intervention: Education      Discipline Responsible: Behavorial Health Tech      Signature:  Blanca Viera

## 2020-07-05 PROCEDURE — 1240000000 HC EMOTIONAL WELLNESS R&B

## 2020-07-05 PROCEDURE — 6370000000 HC RX 637 (ALT 250 FOR IP): Performed by: HOSPITALIST

## 2020-07-05 PROCEDURE — 6370000000 HC RX 637 (ALT 250 FOR IP): Performed by: PSYCHIATRY & NEUROLOGY

## 2020-07-05 RX ORDER — RISPERIDONE 0.5 MG/1
0.5 TABLET, FILM COATED ORAL NIGHTLY
Status: DISCONTINUED | OUTPATIENT
Start: 2020-07-05 | End: 2020-07-06

## 2020-07-05 RX ADMIN — HYDROXYZINE PAMOATE 50 MG: 50 CAPSULE ORAL at 17:24

## 2020-07-05 RX ADMIN — PANTOPRAZOLE SODIUM 40 MG: 40 TABLET, DELAYED RELEASE ORAL at 06:04

## 2020-07-05 RX ADMIN — GABAPENTIN 300 MG: 300 CAPSULE ORAL at 13:51

## 2020-07-05 RX ADMIN — SUCRALFATE 1 G: 1 TABLET ORAL at 21:30

## 2020-07-05 RX ADMIN — OXCARBAZEPINE 300 MG: 300 TABLET, FILM COATED ORAL at 21:31

## 2020-07-05 RX ADMIN — IBUPROFEN 800 MG: 800 TABLET ORAL at 10:03

## 2020-07-05 RX ADMIN — GABAPENTIN 300 MG: 300 CAPSULE ORAL at 10:08

## 2020-07-05 RX ADMIN — GABAPENTIN 300 MG: 300 CAPSULE ORAL at 21:30

## 2020-07-05 RX ADMIN — PROPRANOLOL HYDROCHLORIDE 20 MG: 20 TABLET ORAL at 10:03

## 2020-07-05 RX ADMIN — SUCRALFATE 1 G: 1 TABLET ORAL at 13:51

## 2020-07-05 RX ADMIN — CYCLOBENZAPRINE 10 MG: 10 TABLET, FILM COATED ORAL at 21:30

## 2020-07-05 RX ADMIN — ACETAMINOPHEN 650 MG: 325 TABLET, FILM COATED ORAL at 17:24

## 2020-07-05 RX ADMIN — RISPERIDONE 0.5 MG: 0.5 TABLET ORAL at 21:30

## 2020-07-05 RX ADMIN — CLONAZEPAM 0.5 MG: 0.5 TABLET ORAL at 13:51

## 2020-07-05 RX ADMIN — CLONAZEPAM 0.5 MG: 0.5 TABLET ORAL at 21:31

## 2020-07-05 RX ADMIN — LEVOTHYROXINE SODIUM 137 MCG: 0.03 TABLET ORAL at 06:05

## 2020-07-05 RX ADMIN — PROPRANOLOL HYDROCHLORIDE 20 MG: 20 TABLET ORAL at 21:30

## 2020-07-05 RX ADMIN — SUCRALFATE 1 G: 1 TABLET ORAL at 10:05

## 2020-07-05 RX ADMIN — OXCARBAZEPINE 300 MG: 300 TABLET, FILM COATED ORAL at 10:04

## 2020-07-05 RX ADMIN — CLONAZEPAM 0.5 MG: 0.5 TABLET ORAL at 10:04

## 2020-07-05 RX ADMIN — SUCRALFATE 1 G: 1 TABLET ORAL at 17:24

## 2020-07-05 RX ADMIN — IBUPROFEN 800 MG: 800 TABLET ORAL at 21:31

## 2020-07-05 RX ADMIN — TRAZODONE HYDROCHLORIDE 50 MG: 50 TABLET ORAL at 21:31

## 2020-07-05 ASSESSMENT — PAIN SCALES - GENERAL
PAINLEVEL_OUTOF10: 7
PAINLEVEL_OUTOF10: 8
PAINLEVEL_OUTOF10: 8

## 2020-07-05 NOTE — CARE COORDINATION
Brief Intervention and Referral to Treatment Summary    Patient was provided PHQ-9, AUDIT and DAST Screening:      PHQ-9 Score: 16  AUDIT Score: 0   DAST Score: 0     Patients substance use is considered     Low Risk/Healthy x  Moderate Risk  Harmful  Dependent    Patients depression is considered:     Minimal  Mild   Moderate x  Moderately Severe  Severe    Brief Education Was Provided N/A    Patient was receptive  Patient was not receptive      Brief Intervention Is Provided (Only for AUDIT or DAST) N/A    Patient reports readiness to decrease and/or stop use and a plan was discussed   Patient denies readiness to decrease and/or stop use and a plan was not discussed      Recommendations/Referrals for Brief and/or Specialized Treatment Provided to Patient   Patient denied any difficulties with drugs or alcohol. She was forthcoming about self-medicating with alcohol recently. In addition, patient has made three attempts on her life by overdose with drugs/alcohol.      Electronically signed by Corey Mercedes on 7/5/2020 at 10:26 AM

## 2020-07-05 NOTE — CARE COORDINATION
FAMILY COLLATERAL NOTE    Family/Support Name: \"Aunt Figueroa\"  Contact #: [phone]  Relationship to Pt: aunt      Placed call to above. Response:   called number provided by patient. Call was not answered and could not leave voicemail. Voicemail box not set up.          CELENA Pal

## 2020-07-05 NOTE — CARE COORDINATION
[facility] Biopsychosocial Assessment    Current Level of Psychosocial Functioning     Independent   Dependent    Minimal Assist x    Comments:  Patient lives on her own. She is unemployed but receives government assistance to maintain a reasonable quality of life. Psychosocial High Risk Factors (check all that apply)    Unable to obtain meds   Chronic illness/pain  x  Substance abuse   Lack of Family Support   Financial stress   Isolation   Inadequate Community Resources  Suicide attempt(s)  Not taking medications   Victim of crime   Developmental Delay  Unable to manage personal needs    Age 72 or older   Homeless  No transportation   Readmission within 30 days  Unemployment x  Traumatic Event    Comments: Patient has at least two high risk factors associated with this admission. Psychiatric Advanced Directives: None Reported. Family to Involve in Treatment: Patient provided her aunt's contact information to complete collateral.    Sexual Orientation:  Patient is in neither a hetero or homosexual relationship at this time. Patient Strengths: Patient was cooperative and engaging. Patient Barriers: None Identified. Opiate Education Provided:  N/A    CMHC/mental health history: Patient is a client at [facility]. Plan of Care   medication management, group/individual therapies, family meetings, psycho -education, treatment team meetings to assist with stabilization    Initial Discharge Plan:  Patient will return home and continue community care at [facility]. Clinical Summary:    Patient is a 38year old female who was admitted to the [facility] due to depression and thoughts of wishing to be dead. Reportedly, patient was driving under the influence. She was stopped by the police. Patient verbalized thoughts of self-harm and was taken to the ER for further evaluation. When interviewed, patient presented as manic with rapid pressured speech.  She seemed to want to convince the  that things are going well in her life and the recent episode of driving under the influence was abnormal for her. Patient was cooperative and engaging. She remained manic the entire session.      Electronically signed by Kayla Ladd on 7/5/2020 at 10:35 AM

## 2020-07-05 NOTE — PROGRESS NOTES
Pt noted up on unit all am, noted to be with  and doctor, explained and gave all am meds, pt is talkative with rapid pressured speech, agreeable to klonopin 0.5 pt reports anxiety # 6 , pain #8 motrin given. neurontin 300mg .

## 2020-07-05 NOTE — PROGRESS NOTES
Pt reports her car is in lock up and family is trying to get it out, pt expressed much stress over this, Bp 96/57, held 2pm inderal  . Pt is in group now.

## 2020-07-05 NOTE — PROGRESS NOTES
Seroquel is ordered for tonight and patient is stating she is allergic and refuses it. States it slows her heart rate.  This nurse added it to her allergies and notified the psychiatrist.  Electronically signed by Florencia Dumont RN on 7/4/20 at 9:21 PM EDT

## 2020-07-05 NOTE — PROGRESS NOTES
Pt. attended the 0900 community meeting. Electronically signed by Jean Marinelli [address] on 7/5/2020 at 9:32 AM

## 2020-07-05 NOTE — BH NOTE
Patient offers some somatic complaints that since missing some medication yesterday that her arthritis is somewhat more than normal. She denies SI, but can be reactive if others anger her (none current). Speech is rapid and pressured with FOI and disconnected thoughts. Patient reports depression is better. Confronted her about still being manic and she shushhed me, behaving like it was a secret. Made he aware that we notice the same. No evidence of hallucinations. Still refers to herself as a miracle for surviving near death experiences.

## 2020-07-05 NOTE — PROGRESS NOTES
Patient did not participate in the [facility] despite staff encouragement.  Electronically signed by Thierno Curry on 7/4/2020 at 10:10 PM

## 2020-07-05 NOTE — GROUP NOTE
Group Therapy Note    Date: 7/5/2020    Group Start Time: 1000  Group End Time: 1050  Group Topic: Psychoeducation    [facility]    Kya Velazco        Group Therapy Note    Attendees: 8         Patient's Goal:  \"To attend all the groups\"    Notes:  Patient is very talkative and sociable. She work adequately on her task in group. Status After Intervention:  Unchanged    Participation Level: Adequate    Participation Quality: Attentive      Speech:  talkative      Thought Process/Content: Linear      Affective Functioning: Exaggerated      Mood: kept laughing and joking a lot.       Level of consciousness:  Alert      Response to Learning: Progressing to goal      Endings: None Reported    Modes of Intervention: Education, Socialization and Activity      Discipline Responsible: Psychoeducational Specialist      Signature:  Kya Velazco

## 2020-07-06 PROBLEM — F14.10 COCAINE USE DISORDER (HCC): Status: ACTIVE | Noted: 2020-07-06

## 2020-07-06 PROCEDURE — 99233 SBSQ HOSP IP/OBS HIGH 50: CPT | Performed by: PSYCHIATRY & NEUROLOGY

## 2020-07-06 PROCEDURE — 6370000000 HC RX 637 (ALT 250 FOR IP): Performed by: PSYCHIATRY & NEUROLOGY

## 2020-07-06 PROCEDURE — 6370000000 HC RX 637 (ALT 250 FOR IP): Performed by: HOSPITALIST

## 2020-07-06 PROCEDURE — 1240000000 HC EMOTIONAL WELLNESS R&B

## 2020-07-06 RX ORDER — ZOLPIDEM TARTRATE 5 MG/1
10 TABLET ORAL NIGHTLY PRN
Status: DISCONTINUED | OUTPATIENT
Start: 2020-07-06 | End: 2020-07-08 | Stop reason: HOSPADM

## 2020-07-06 RX ADMIN — CLONAZEPAM 0.5 MG: 0.5 TABLET ORAL at 14:00

## 2020-07-06 RX ADMIN — LEVOTHYROXINE SODIUM 137 MCG: 0.03 TABLET ORAL at 06:35

## 2020-07-06 RX ADMIN — ZOLPIDEM TARTRATE 10 MG: 5 TABLET ORAL at 23:18

## 2020-07-06 RX ADMIN — GABAPENTIN 300 MG: 300 CAPSULE ORAL at 09:10

## 2020-07-06 RX ADMIN — OXCARBAZEPINE 300 MG: 300 TABLET, FILM COATED ORAL at 21:18

## 2020-07-06 RX ADMIN — GABAPENTIN 300 MG: 300 CAPSULE ORAL at 21:18

## 2020-07-06 RX ADMIN — PANTOPRAZOLE SODIUM 40 MG: 40 TABLET, DELAYED RELEASE ORAL at 06:35

## 2020-07-06 RX ADMIN — SUCRALFATE 1 G: 1 TABLET ORAL at 13:57

## 2020-07-06 RX ADMIN — LURASIDONE HYDROCHLORIDE 20 MG: 20 TABLET, FILM COATED ORAL at 13:57

## 2020-07-06 RX ADMIN — GABAPENTIN 300 MG: 300 CAPSULE ORAL at 13:57

## 2020-07-06 RX ADMIN — PROPRANOLOL HYDROCHLORIDE 20 MG: 20 TABLET ORAL at 21:18

## 2020-07-06 RX ADMIN — IBUPROFEN 800 MG: 800 TABLET ORAL at 21:19

## 2020-07-06 RX ADMIN — IBUPROFEN 800 MG: 800 TABLET ORAL at 09:10

## 2020-07-06 RX ADMIN — PROPRANOLOL HYDROCHLORIDE 20 MG: 20 TABLET ORAL at 09:10

## 2020-07-06 RX ADMIN — SUCRALFATE 1 G: 1 TABLET ORAL at 21:18

## 2020-07-06 RX ADMIN — PROPRANOLOL HYDROCHLORIDE 20 MG: 20 TABLET ORAL at 13:57

## 2020-07-06 RX ADMIN — SUCRALFATE 1 G: 1 TABLET ORAL at 09:10

## 2020-07-06 RX ADMIN — TRAZODONE HYDROCHLORIDE 50 MG: 50 TABLET ORAL at 21:18

## 2020-07-06 RX ADMIN — CLONAZEPAM 0.5 MG: 0.5 TABLET ORAL at 09:09

## 2020-07-06 RX ADMIN — SUCRALFATE 1 G: 1 TABLET ORAL at 17:35

## 2020-07-06 RX ADMIN — CYCLOBENZAPRINE 10 MG: 10 TABLET, FILM COATED ORAL at 21:18

## 2020-07-06 RX ADMIN — HYDROXYZINE PAMOATE 50 MG: 50 CAPSULE ORAL at 17:46

## 2020-07-06 RX ADMIN — CLONAZEPAM 0.5 MG: 0.5 TABLET ORAL at 21:18

## 2020-07-06 RX ADMIN — OXCARBAZEPINE 300 MG: 300 TABLET, FILM COATED ORAL at 09:10

## 2020-07-06 ASSESSMENT — PAIN SCALES - GENERAL
PAINLEVEL_OUTOF10: 8
PAINLEVEL_OUTOF10: 9

## 2020-07-06 NOTE — GROUP NOTE
Group Therapy Note    Date: 7/6/2020    Group Start Time: 1110  Group End Time: 1150  Group Topic: Group Therapy    [facility]    CELENA Beckford        Group Therapy Note    Attendees: 9         Patient's Goal: To participate in supporting fellow group members. Notes:  Patient discussed why life is worth living. Status After Intervention:  Improved    Participation Level: Active Listener    Participation Quality: Appropriate      Speech:  normal      Thought Process/Content: Logical      Affective Functioning: Congruent      Mood: elevated      Level of consciousness:  Alert      Response to Learning: Able to verbalize current knowledge/experience      Endings: None Reported    Modes of Intervention: Education      Discipline Responsible: /Counselor      Signature:   CELENA Beckford

## 2020-07-06 NOTE — PROGRESS NOTES
Patient did not participate in the [address] [facility] despite staff encouragement.  Electronically signed by Los Whiteside on 7/6/2020 at 7:22 PM

## 2020-07-06 NOTE — FLOWSHEET NOTE
Pt has been visible on unit. Moving quickly around. Often noted talking to peers or on the phone. Presents with FOI and pressured speech. Admits to feeling depressed due to missing her kids. Says she feels anxious and irritable. Upset with herself for drinking for the first time in 20 years. Did not sleep well last night up every 20 minutes. Denies SI/HI/AVH. Would like to get her Kyra Blade and xanax prescribed for her while she is here.

## 2020-07-06 NOTE — BH NOTE
FAMILY COLLATERAL NOTE    Family/Support Name: Benitez Avalos #: [phone]  Relationship to Pt: Aunt    Collateral evasive, poverty of content. Unwilling to disclose personal information, vague in responses. Family/Support contact aware of hospitalization:  yes  Presenting Symptoms/Current Concerns:  dx with CA twice, 1st time 2 years ago, now 3 months ago unaware of type of ca  pt is secretive regarding ca and does not allow anyone to accompany her to doctor or to know what is happening with her health. \"She is a very private person and I probably shouldn't be telling you thin. \"    Top 3 Life Stressors:   Cancer Dx  accident injured-pain control  depression      Background History Relevant to Current Hospitalization:  hospitalized in 2020 [facility] for depression  no hx of SA or suidical statements. denies HI, AVH      Family Mental Health/Substance Use History:   just started etoh after ca dx \"I don't know how much she drinks, but its probably not too much\"  denies known illicit substance use and denies etoh abuse    Support Network's Goal for Hospitalization:   to get her feeling better and home    Discharge Plan:   to go to private residence with 32year old daughter-live together, collateral lives down the street    Support Network Supportive of Discharge Plan:   yes  Daughter (lives with pt. 32years old) and mother-Satanta  Support can confirm Safety of Location and Security of Weapons:   no weapons in house    Support agreeable to [employer] and Monitor Medications (including Prescription and OTC):   pt takes medication without concern, pill box used, Aunt will assist with medications if necessary. Identified Barriers to Compliance with Discharge Plan:   depression  Recommendations for Support Network:   Call [id number] with questions.        Davey Ureña RN

## 2020-07-06 NOTE — PROGRESS NOTES
[facility]us [facility] 89. FOLLOW-UP NOTE   Virtual,tele visit   Provider location [facility]  Patient location  3w  [facility]    7/5/2020     Patient was seen and examined via tele  Chart reviewed   Patient's case discussed with staff/team    Chief Complaint: suicidal thoughts racing thoughts mixed episode    Interim History:     Patient reports feeling tired misses her children  Overwhelmed by a new legal problem as police impounded her car  Patient continues to endorse suicidal ideations  Labile mood, somatic and manic  Appetite: poor  [] Normal/Unchanged  [] Increased  [x] Decreased      Sleep:       [] Normal/Unchanged  [] Fair       [x] Poor              Energy:    [] Normal/Unchanged  [x] Increased  [] Decreased        SI [x] Present  [] Absent    HI  []Present  [x] Absent     Aggression:  [] yes  [x] no    Patient is [] able  [x] unable to CONTRACT FOR SAFETY     PAST MEDICAL/PSYCHIATRIC HISTORY:   No past medical history on file. FAMILY/SOCIAL HISTORY:  No family history on file.   Social History     Socioeconomic History    Marital status:      Spouse name: Not on file    Number of children: Not on file    Years of education: Not on file    Highest education level: Not on file   Occupational History    Not on file   Social Needs    Financial resource strain: Not on file    Food insecurity     Worry: Not on file     Inability: Not on file    Transportation needs     Medical: Not on file     Non-medical: Not on file   Tobacco Use    Smoking status: Not on file   Substance and Sexual Activity    Alcohol use: Not Currently    Drug use: Not on file    Sexual activity: Not on file   Lifestyle    Physical activity     Days per week: Not on file     Minutes per session: Not on file    Stress: Not on file   Relationships    Social connections     Talks on phone: Not on file     Gets together: Not on file     Attends Islam service: Not on file Active member of club or organization: Not on file     Attends meetings of clubs or organizations: Not on file     Relationship status: Not on file    Intimate partner violence     Fear of current or ex partner: Not on file     Emotionally abused: Not on file     Physically abused: Not on file     Forced sexual activity: Not on file   Other Topics Concern    Not on file   Social History Narrative    Not on file           ROS:  [x] All negative/unchanged except if checked.  Explain positive(checked items) below:  [] Constitutional  [] Eyes  [] Ear/Nose/Mouth/Throat  [] Respiratory  [] CV  [] GI  []   [] Musculoskeletal  [] Skin/Breast  [] Neurological  [] Endocrine  [] Heme/Lymph  [] Allergic/Immunologic    Explanation:     MEDICATIONS:    Current Facility-Administered Medications:     risperiDONE (RISPERDAL) tablet 0.5 mg, 0.5 mg, Oral, Nightly, Renny De León MD, 0.5 mg at 07/05/20 2130    haloperidol lactate (HALDOL) injection 5 mg, 5 mg, Intramuscular, Q6H PRN **OR** haloperidol (HALDOL) tablet 5 mg, 5 mg, Oral, Q6H PRN, Vince De León MD    hydrOXYzine (VISTARIL) capsule 50 mg, 50 mg, Oral, Q6H PRN, 50 mg at 07/05/20 1724 **OR** hydrOXYzine (VISTARIL) injection 50 mg, 50 mg, Intramuscular, Q6H PRN, Vince De León MD    acetaminophen (TYLENOL) tablet 650 mg, 650 mg, Oral, Q4H PRN, Renny De León MD, 650 mg at 07/05/20 1724    traZODone (DESYREL) tablet 50 mg, 50 mg, Oral, Nightly PRN, Vince De León MD, 50 mg at 07/05/20 2131    benztropine mesylate (COGENTIN) injection 2 mg, 2 mg, Intramuscular, BID PRN, Vince De León MD    magnesium hydroxide (MILK OF MAGNESIA) 400 MG/5ML suspension 30 mL, 30 mL, Oral, Daily PRN, Vince De León MD    nicotine polacrilex (COMMIT) lozenge 2 mg, 2 mg, Oral, Q2H PRN, Renny De León MD    clonazePAM (KLONOPIN) tablet 0.5 mg, 0.5 mg, Oral, TID, Renny De León MD, 0.5 mg at 07/05/20 2131    OXcarbazepine (TRILEPTAL) tablet 300 mg, 300 mg, Oral, BID, Renny De León MD, 300 mg at 07/05/20 2131    cyclobenzaprine (FLEXERIL) tablet 10 mg, 10 mg, Oral, Nightly, Blessing Kitchen, APRN - CNP, 10 mg at 07/05/20 2130    gabapentin (NEURONTIN) capsule 300 mg, 300 mg, Oral, TID, Blessing Kitchen, APRN - CNP, 300 mg at 07/05/20 2130    ibuprofen (ADVIL;MOTRIN) tablet 800 mg, 800 mg, Oral, BID, Blessing Kitchen, APRN - CNP, 800 mg at 07/05/20 2131    levothyroxine (SYNTHROID) tablet 137 mcg, 137 mcg, Oral, Daily, Blessing Kitchen, APRN - CNP, 137 mcg at 07/05/20 0605    pantoprazole (PROTONIX) tablet 40 mg, 40 mg, Oral, QAM AC, Michelle Pentito, APRN - CNP, 40 mg at 07/05/20 0604    propranolol (INDERAL) tablet 20 mg, 20 mg, Oral, TID, Blessing Kitchen, APRN - CNP, 20 mg at 07/05/20 2130    sucralfate (CARAFATE) tablet 1 g, 1 g, Oral, 4x Daily, Blessing Kitchen, APRN - CNP, 1 g at 07/05/20 2130      Examination:  /68   Pulse 87   Temp 99 °F (37.2 °C) (Oral)   Resp 16   Ht 5' 3\" (1.6 m)   Wt 176 lb (79.8 kg)   SpO2 96%   BMI 31.18 kg/m²   Gait - steady  Medication side effects(SE): denies    Mental Status Examination:    Level of consciousness:  within normal limits   Appearance:  fair grooming and fair hygiene  Behavior/Motor:  psychomotor retardation  Attitude toward examiner:  attentive  Speech:  increased latency and monotone   Mood: depressed  Affect:  blunted, anxious and intense  Thought processes:  goal directed   Thought content:  Homocidal ideation denies  Suicidal Ideation:  passive  Delusions:  ideas of reference  Perceptual Disturbance:  denies any perceptual disturbance  Cognition:  disoriented   Concentration intact  Insight fair   Judgement poor     ASSESSMENT: still anxious overwhelmed  Patient symptoms are:  [] Well controlled  [] Improving  [] Worsening  [] No change      Diagnosis: bipolar disorder mixed  Active Problems:    * No active hospital problems.  *  Resolved Problems:    * No resolved hospital problems. *      LABS:    Recent Labs     07/03/20 2036   WBC 7.8   HGB 12.6        Recent Labs     07/03/20 2036      K 3.8      CO2 24   BUN 11   CREATININE 0.95*   GLUCOSE 110*     Recent Labs     07/03/20 2036   BILITOT <0.2   ALKPHOS 39*   AST 13   ALT 18     Lab Results   Component Value Date    LABAMPH Neg 07/03/2020    BARBSCNU Neg 07/03/2020    LABBENZ Neg 07/03/2020    LABMETH Neg 07/03/2020    OPIATESCREENURINE Neg 07/03/2020    PHENCYCLIDINESCREENURINE Neg 07/03/2020    ETOH 81 07/04/2020     Lab Results   Component Value Date    TSH 4.620 07/03/2020     No results found for: LITHIUM  No results found for: VALPROATE, CBMZ    RISK ASSESSMENT: high risk of suicide  As she has high impulsivity    Treatment Plan:  Reviewed current Medications with the patient. yes  Risks, benefits, side effects, drug-to-drug interactions and alternatives to treatment were discussed. Collateral information: mpme  CD evaluationyes  Encourage patient to attend group and other milieu activities. Discharge planning discussed with the patient and treatment team.    PSYCHOTHERAPY/COUNSELING:  [x] Therapeutic interview  [x] Supportive  [] CBT  [] Ongoing  [] Other    [x] Patient continues to need, on a daily basis, active treatment furnished directly by or requiring the supervision of inpatient psychiatric personnel      Anticipated Length of stay:continue increasing risperidone. Vivian Bellamy              Electronically signed by Vivian Bellamy MD on 7/5/2020 at 11:22 PM

## 2020-07-06 NOTE — GROUP NOTE
Group Therapy Note    Date: 7/5/2020    Group Start Time: 2100  Group End Time: 2140  Group Topic: Wrap-Up    [facility]    Karma Hung        Group Therapy Note    Attendees: 10/23       Patient's Goal:  Patient's goal was to go to \"as many groups as possible. \"    Notes:  Patient reports meeting their goal for today. Patient participated in a guided meditation for anxiety reduction following Wrap Up Group. Status After Intervention:  Improved    Participation Level:  Active Listener and Interactive    Participation Quality: Appropriate, Attentive and Sharing      Speech:  normal      Thought Process/Content: Logical      Affective Functioning: Exaggerated      Mood: euthymic      Level of consciousness:  Alert and Attentive      Response to Learning: Able to verbalize current knowledge/experience and Progressing to goal      Endings: None Reported    Modes of Intervention: Support      Discipline Responsible: [address], [facility] [employer]      Signature:  Karma Hung

## 2020-07-06 NOTE — PROGRESS NOTES
Pt has been out more today and is social . Pt reports she is stressed  ''My car is impounded and I don't know how I will get it out''. Pt has been attending select groups . Pt continues to have pressured speech with flight of ideas. Pt denies SI,HI,AVH.

## 2020-07-06 NOTE — PROGRESS NOTES
Pt. attended the 0900 community meeting. Electronically signed by Sully Stern, [address] on 7/6/2020 at 9:55 AM

## 2020-07-06 NOTE — PROGRESS NOTES
[facility] FOLLOW-UP NOTE       7/6/2020     Patient was seen and examined in person, Chart reviewed   Patient's case discussed with staff/team    Chief Complaint: Jeanine    Interim History:     Pt still has been labile with racing thoughts  Pt has been impulsively drinking  Has been feeling depressed with her son in [employer], and daughter in [address] pregnant  overwhelmed with medical problems. Somatic focused - supposed to go for colonoscopy on 9th  Pt has been taking xanax for 10 years- 4 tab per day and ambien for sleep  First time drank in 20 years  [facility] checked and confirmed xanax and ambien prescription   Remain manic during the interview  Appetite:   [x] Normal/Unchanged  [] Increased  [] Decreased      Sleep:       [] Normal/Unchanged  [x] Fair       [] Poor              Energy:    [] Normal/Unchanged  [] Increased  [x] Decreased        SI [] Present  [x] Absent    HI  []Present  [x] Absent     Aggression:  [] yes  [x] no    Patient is [x] able  [] unable to CONTRACT FOR SAFETY     PAST MEDICAL/PSYCHIATRIC HISTORY:   No past medical history on file. FAMILY/SOCIAL HISTORY:  No family history on file.   Social History     Socioeconomic History    Marital status:      Spouse name: Not on file    Number of children: Not on file    Years of education: Not on file    Highest education level: Not on file   Occupational History    Not on file   Social Needs    Financial resource strain: Not on file    Food insecurity     Worry: Not on file     Inability: Not on file    Transportation needs     Medical: Not on file     Non-medical: Not on file   Tobacco Use    Smoking status: Not on file   Substance and Sexual Activity    Alcohol use: Not Currently    Drug use: Not on file    Sexual activity: Not on file   Lifestyle    Physical activity     Days per week: Not on file     Minutes per session: Not on file    Stress: Not on file   Relationships    Social connections     Talks on phone: Not on file     Gets together: Not on file     Attends Sabianism service: Not on file     Active member of club or organization: Not on file     Attends meetings of clubs or organizations: Not on file     Relationship status: Not on file    Intimate partner violence     Fear of current or ex partner: Not on file     Emotionally abused: Not on file     Physically abused: Not on file     Forced sexual activity: Not on file   Other Topics Concern    Not on file   Social History Narrative    Not on file           ROS:  [x] All negative/unchanged except if checked.  Explain positive(checked items) below:  [] Constitutional  [] Eyes  [] Ear/Nose/Mouth/Throat  [] Respiratory  [] CV  [] GI  []   [] Musculoskeletal  [] Skin/Breast  [] Neurological  [] Endocrine  [] Heme/Lymph  [] Allergic/Immunologic    Explanation:     MEDICATIONS:    Current Facility-Administered Medications:     risperiDONE (RISPERDAL) tablet 0.5 mg, 0.5 mg, Oral, Nightly, Renny De León MD, 0.5 mg at 07/05/20 2130    haloperidol lactate (HALDOL) injection 5 mg, 5 mg, Intramuscular, Q6H PRN **OR** haloperidol (HALDOL) tablet 5 mg, 5 mg, Oral, Q6H PRN, Ousmane De León MD    hydrOXYzine (VISTARIL) capsule 50 mg, 50 mg, Oral, Q6H PRN, 50 mg at 07/05/20 1724 **OR** hydrOXYzine (VISTARIL) injection 50 mg, 50 mg, Intramuscular, Q6H PRN, Ousmane De León MD    acetaminophen (TYLENOL) tablet 650 mg, 650 mg, Oral, Q4H PRN, Renny De León MD, 650 mg at 07/05/20 1724    traZODone (DESYREL) tablet 50 mg, 50 mg, Oral, Nightly PRN, Ousmane De León MD, 50 mg at 07/05/20 2131    benztropine mesylate (COGENTIN) injection 2 mg, 2 mg, Intramuscular, BID PRN, Ousmane De León MD    magnesium hydroxide (MILK OF MAGNESIA) 400 MG/5ML suspension 30 mL, 30 mL, Oral, Daily PRN, Ousmane De León MD    nicotine polacrilex (COMMIT) lozenge 2 mg, 2 mg, Oral, Q2H PRN, Ousmane Beltran MD Sarthak    clonazePAM (KLONOPIN) tablet 0.5 mg, 0.5 mg, Oral, TID, Renny De León MD, 0.5 mg at 07/06/20 0909    OXcarbazepine (TRILEPTAL) tablet 300 mg, 300 mg, Oral, BID, Renny De León MD, 300 mg at 07/06/20 0910    cyclobenzaprine (FLEXERIL) tablet 10 mg, 10 mg, Oral, Nightly, [facility] Set[facility], APRN - CNP, 10 mg at 07/05/20 2130    gabapentin (NEURONTIN) capsule 300 mg, 300 mg, Oral, TID, Windell Setters, APRN - CNP, 300 mg at 07/06/20 0910    ibuprofen (ADVIL;MOTRIN) tablet 800 mg, 800 mg, Oral, BID, Windell Setters, APRN - CNP, 800 mg at 07/06/20 2789    levothyroxine (SYNTHROID) tablet 137 mcg, 137 mcg, Oral, Daily, [facility], APRN - CNP, 137 mcg at 07/06/20 8662    pantoprazole (PROTONIX) tablet 40 mg, 40 mg, Oral, QAM AC, Michelle Pentito, APRN - CNP, 40 mg at 07/06/20 3434    propranolol (INDERAL) tablet 20 mg, 20 mg, Oral, TID, [facility], APRN - CNP, 20 mg at 07/06/20 0910    sucralfate (CARAFATE) tablet 1 g, 1 g, Oral, 4x Daily, [facility] Cordell APRN - CNP, 1 g at 07/06/20 9741      Examination:  /67   Pulse 98   Temp 96 °F (35.6 °C) (Oral)   Resp 16   Ht 5' 3\" (1.6 m)   Wt 176 lb (79.8 kg)   SpO2 96%   BMI 31.18 kg/m²   Gait - steady  Medication side effects(SE): no    Mental Status Examination:    Level of consciousness:  within normal limits   Appearance:  poor grooming and poor hygiene  Behavior/Motor:  hyperactive  Attitude toward examiner:  playful  Speech:  loud and hyperverbal   Mood: labile  Affect:  mood incongruent  Thought processes:  rapid and overabundance of ideas   Thought content:  Delusions:  somatic  Perceptual Disturbance:  denies any perceptual disturbance  Cognition:  oriented to person, place, and time   Concentration poor  Insight poor   Judgement poor     ASSESSMENT:   Patient symptoms are:  [] Well controlled  [] Improving  [] Worsening  [x] No change      Diagnosis:   Active Problems:    Mixed bipolar affective disorder

## 2020-07-06 NOTE — PROGRESS NOTES
Patient did not attend the 1900 Activity Group despite staff encouragement.  Electronically signed by Pk Serna on 7/5/2020 at 9:44 PM

## 2020-07-07 PROCEDURE — 6370000000 HC RX 637 (ALT 250 FOR IP): Performed by: PSYCHIATRY & NEUROLOGY

## 2020-07-07 PROCEDURE — 99232 SBSQ HOSP IP/OBS MODERATE 35: CPT | Performed by: PSYCHIATRY & NEUROLOGY

## 2020-07-07 PROCEDURE — 1240000000 HC EMOTIONAL WELLNESS R&B

## 2020-07-07 PROCEDURE — 6370000000 HC RX 637 (ALT 250 FOR IP): Performed by: HOSPITALIST

## 2020-07-07 PROCEDURE — 90833 PSYTX W PT W E/M 30 MIN: CPT | Performed by: PSYCHIATRY & NEUROLOGY

## 2020-07-07 RX ADMIN — OXCARBAZEPINE 300 MG: 300 TABLET, FILM COATED ORAL at 09:06

## 2020-07-07 RX ADMIN — GABAPENTIN 300 MG: 300 CAPSULE ORAL at 14:08

## 2020-07-07 RX ADMIN — PANTOPRAZOLE SODIUM 40 MG: 40 TABLET, DELAYED RELEASE ORAL at 06:05

## 2020-07-07 RX ADMIN — GABAPENTIN 300 MG: 300 CAPSULE ORAL at 20:52

## 2020-07-07 RX ADMIN — ZOLPIDEM TARTRATE 10 MG: 5 TABLET ORAL at 22:11

## 2020-07-07 RX ADMIN — CLONAZEPAM 0.5 MG: 0.5 TABLET ORAL at 20:52

## 2020-07-07 RX ADMIN — LURASIDONE HYDROCHLORIDE 20 MG: 20 TABLET, FILM COATED ORAL at 09:06

## 2020-07-07 RX ADMIN — PROPRANOLOL HYDROCHLORIDE 20 MG: 20 TABLET ORAL at 09:05

## 2020-07-07 RX ADMIN — CYCLOBENZAPRINE 10 MG: 10 TABLET, FILM COATED ORAL at 20:52

## 2020-07-07 RX ADMIN — LEVOTHYROXINE SODIUM 137 MCG: 0.03 TABLET ORAL at 06:05

## 2020-07-07 RX ADMIN — SUCRALFATE 1 G: 1 TABLET ORAL at 17:01

## 2020-07-07 RX ADMIN — SUCRALFATE 1 G: 1 TABLET ORAL at 09:05

## 2020-07-07 RX ADMIN — SUCRALFATE 1 G: 1 TABLET ORAL at 14:08

## 2020-07-07 RX ADMIN — IBUPROFEN 800 MG: 800 TABLET ORAL at 20:55

## 2020-07-07 RX ADMIN — CLONAZEPAM 0.5 MG: 0.5 TABLET ORAL at 14:08

## 2020-07-07 RX ADMIN — HYDROXYZINE PAMOATE 50 MG: 50 CAPSULE ORAL at 12:30

## 2020-07-07 RX ADMIN — PROPRANOLOL HYDROCHLORIDE 20 MG: 20 TABLET ORAL at 14:08

## 2020-07-07 RX ADMIN — IBUPROFEN 800 MG: 800 TABLET ORAL at 09:05

## 2020-07-07 RX ADMIN — GABAPENTIN 300 MG: 300 CAPSULE ORAL at 09:06

## 2020-07-07 RX ADMIN — SUCRALFATE 1 G: 1 TABLET ORAL at 20:52

## 2020-07-07 RX ADMIN — PROPRANOLOL HYDROCHLORIDE 20 MG: 20 TABLET ORAL at 20:52

## 2020-07-07 RX ADMIN — CLONAZEPAM 0.5 MG: 0.5 TABLET ORAL at 09:06

## 2020-07-07 RX ADMIN — HYDROXYZINE PAMOATE 50 MG: 50 CAPSULE ORAL at 18:30

## 2020-07-07 RX ADMIN — OXCARBAZEPINE 300 MG: 300 TABLET, FILM COATED ORAL at 20:52

## 2020-07-07 ASSESSMENT — PAIN SCALES - GENERAL
PAINLEVEL_OUTOF10: 9
PAINLEVEL_OUTOF10: 8

## 2020-07-07 NOTE — GROUP NOTE
Group Therapy Note    Date: 7/7/2020    Group Start Time: 1000  Group End Time: 1100  Group Topic: Psychoeducation    [facility]    Kerry Boyle        Group Therapy Note    Attendees: 6         Patient's Goal:  \"To be calm\"    Notes:  Patient was talkative, had a brighter affect and work well on her project in group. Status After Intervention:  Improved    Participation Level:  Active Listener    Participation Quality: Appropriate and Attentive      Speech:  normal      Thought Process/Content: Linear      Affective Functioning: Exaggerated      Mood: anxious      Level of consciousness:  Alert      Response to Learning: Progressing to goal      Endings: None Reported    Modes of Intervention: Education, Socialization and Activity      Discipline Responsible: Psychoeducational Specialist      Signature:  Sully Stern

## 2020-07-07 NOTE — FLOWSHEET NOTE
Pt has been visible on the unit. Bright and social with peers. Preoccupied with an interaction with a nurse last night. Happy she was able to keep her cool. Continues to feel anxious. Talks of need for colonoscopy due to a polyp being found on EGD. Worried it may be cancer. Continues to have FOI. Slept well last night. Appetite good. Feels better and is happy for the respite.

## 2020-07-07 NOTE — PROGRESS NOTES
phone: Not on file     Gets together: Not on file     Attends Sabianist service: Not on file     Active member of club or organization: Not on file     Attends meetings of clubs or organizations: Not on file     Relationship status: Not on file    Intimate partner violence     Fear of current or ex partner: Not on file     Emotionally abused: Not on file     Physically abused: Not on file     Forced sexual activity: Not on file   Other Topics Concern    Not on file   Social History Narrative    Not on file           ROS:  [x] All negative/unchanged except if checked.  Explain positive(checked items) below:  [] Constitutional  [] Eyes  [] Ear/Nose/Mouth/Throat  [] Respiratory  [] CV  [] GI  []   [] Musculoskeletal  [] Skin/Breast  [] Neurological  [] Endocrine  [] Heme/Lymph  [] Allergic/Immunologic    Explanation:     MEDICATIONS:    Current Facility-Administered Medications:     [START ON 7/8/2020] lurasidone (LATUDA) tablet 40 mg, 40 mg, Oral, Daily, Daniella Alexis MD    zolpidem (AMBIEN) tablet 10 mg, 10 mg, Oral, Nightly PRN, Daniella Alexis MD, 10 mg at 07/06/20 2318    haloperidol lactate (HALDOL) injection 5 mg, 5 mg, Intramuscular, Q6H PRN **OR** haloperidol (HALDOL) tablet 5 mg, 5 mg, Oral, Q6H PRN, Florentin De León MD    hydrOXYzine (VISTARIL) capsule 50 mg, 50 mg, Oral, Q6H PRN, 50 mg at 07/06/20 1746 **OR** hydrOXYzine (VISTARIL) injection 50 mg, 50 mg, Intramuscular, Q6H PRN, Florentin De León MD    acetaminophen (TYLENOL) tablet 650 mg, 650 mg, Oral, Q4H PRN, Renny De León MD, 650 mg at 07/05/20 1724    benztropine mesylate (COGENTIN) injection 2 mg, 2 mg, Intramuscular, BID PRN, Florentin De León MD    magnesium hydroxide (MILK OF MAGNESIA) 400 MG/5ML suspension 30 mL, 30 mL, Oral, Daily PRN, Florentin De León, MD    nicotine polacrilex (COMMIT) lozenge 2 mg, 2 mg, Oral, Q2H PRN, Renny De León MD    clonazePAM (KLONOPIN) tablet 0.5 mg, 0.5 mg, Oral, TID, Renny De León MD, 0.5 mg at 07/07/20 0906    OXcarbazepine (TRILEPTAL) tablet 300 mg, 300 mg, Oral, BID, Renny De León MD, 300 mg at 07/07/20 0906    cyclobenzaprine (FLEXERIL) tablet 10 mg, 10 mg, Oral, Nightly, Darshan Burnsrivaldez APRN - CNP, 10 mg at 07/06/20 2118    gabapentin (NEURONTIN) capsule 300 mg, 300 mg, Oral, TID, Darshan Burnsria, APRN - CNP, 300 mg at 07/07/20 5814    ibuprofen (ADVIL;MOTRIN) tablet 800 mg, 800 mg, Oral, BID, Cherene Bing, APRN - CNP, 800 mg at 07/07/20 5648    levothyroxine (SYNTHROID) tablet 137 mcg, 137 mcg, Oral, Daily, Darshan Burnsria, APRN - CNP, 137 mcg at 07/07/20 0605    pantoprazole (PROTONIX) tablet 40 mg, 40 mg, Oral, QAM AC, Michelle Pentito, APRN - CNP, 40 mg at 07/07/20 0605    propranolol (INDERAL) tablet 20 mg, 20 mg, Oral, TID, Cherene Bing, APRN - CNP, 20 mg at 07/07/20 0905    sucralfate (CARAFATE) tablet 1 g, 1 g, Oral, 4x Daily, Monicaene Bing, APRN - CNP, 1 g at 07/07/20 8933      Examination:  /82   Pulse 89   Temp 98 °F (36.7 °C) (Oral)   Resp 18   Ht 5' 3\" (1.6 m)   Wt 176 lb (79.8 kg)   SpO2 95%   BMI 31.18 kg/m²   Gait - steady  Medication side effects(SE): no    Mental Status Examination:    Level of consciousness:  within normal limits   Appearance:  fair grooming and fair hygiene  Behavior/Motor:  no abnormalities noted  Attitude toward examiner:  cooperative  Speech: less rapid   Mood:less labile  Affect:  mood congruent  Thought processes:  No FOI   Thought content:  Suicidal Ideation:  denies suicidal ideation  Cognition:  oriented to person, place, and time   Concentration intact  Insight fair   Judgement fair     ASSESSMENT:   Patient symptoms are:  [] Well controlled  [] Improving  [] Worsening  [] No change      Diagnosis:   Active Problems:    Mixed bipolar affective disorder ([facility]    Cocaine use disorder ([facility]  Resolved Problems:    * No resolved hospital problems.  *      LABS:    No results for input(s): WBC, HGB, PLT in the last 72 hours. No results for input(s): NA, K, CL, CO2, BUN, CREATININE, GLUCOSE in the last 72 hours. No results for input(s): BILITOT, ALKPHOS, AST, ALT in the last 72 hours. Lab Results   Component Value Date    LABAMPH Neg 07/03/2020    BARBSCNU Neg 07/03/2020    LABBENZ Neg 07/03/2020    LABMETH Neg 07/03/2020    OPIATESCREENURINE Neg 07/03/2020    PHENCYCLIDINESCREENURINE Neg 07/03/2020    ETOH 81 07/04/2020     Lab Results   Component Value Date    TSH 4.620 07/03/2020     No results found for: LITHIUM  No results found for: VALPROATE, CBMZ      Treatment Plan:  Reviewed current Medications with the patient. Medication as ordered  Risks, benefits, side effects, drug-to-drug interactions and alternatives to treatment were discussed. Collateral information: reviewed  CD evaluation  Encourage patient to attend group and other milieu activities. Discharge planning discussed with the patient and treatment team.    PSYCHOTHERAPY/COUNSELING:  [x] Therapeutic interview  [x] Supportive  [] CBT  [] Ongoing  [] Other  Patient was seen 1:1 for 20 minutes, other than E&M time spent, focusing on      - coping skills techniques     - Anxiety management techniques discussed including deep breathing exercise and PMR     - discussing patients strength and weakness      - Motivational interviewing to assess the stage of change and assessing patient readiness to quit substance use.      - Focusing on negative cognition and maladaptive thoughts, which is feeding and maintaining the depression symptoms       [x] Patient continues to need, on a daily basis, active treatment furnished directly by or requiring the supervision of inpatient psychiatric personnel      Anticipated Length of stay:            Electronically signed by Jose Luis Garcia MD on 7/7/2020 at 11:17 AM

## 2020-07-07 NOTE — PROGRESS NOTES
Patient did not attend the 2100 Wrap Up and Relaxation Group despite staff encouragement.  Electronically signed by Ailyn Jones on 7/6/2020 at 10:13 PM

## 2020-07-07 NOTE — GROUP NOTE
Group Therapy Note    Date: 7/7/2020    Group Start Time: 4575  Group End Time: 0658  Group Topic: Recreational    [facility]    Fredy Grewal        Group Therapy Note    Attendees: 10/23         Patient's Goal:  To participate in i[employer] game. Notes:  Patient participated in game with peers.     Status After Intervention:  Improved    Participation Level: Interactive    Participation Quality: Appropriate, Attentive and Supportive      Speech:  normal      Thought Process/Content: Logical      Affective Functioning: Congruent      Mood: euthymic      Level of consciousness:  Alert and Attentive      Response to Learning: Progressing to goal      Endings: None Reported    Modes of Intervention: Activity      Discipline Responsible: [employer] [facility]      Signature:  Fredy Grewal

## 2020-07-07 NOTE — PROGRESS NOTES
Pt. attended the 0900 community meeting. Electronically signed by Korey Denson, [address] on 7/7/2020 at 10:24 AM

## 2020-07-07 NOTE — PROGRESS NOTES
Patient did not attend Healthy Living Group despite staff encouragement.  Electronically signed by Carol Gutierrez on 7/7/2020 at 5:25 PM

## 2020-07-07 NOTE — PROGRESS NOTES
Pt. Alert and oriented x 4, breathing even and unlabored on room air, denies any pain or shortness of breath with breathing, skin warm and dry, mucous membranes moist and pink, denies pain, denies SI, HI, and AVH, c/o anxieyy 8/10, medicated with po prn Vistaril per orders, up ambulating in hallways and to meals in day room.  Electronically signed by Riddhi Gerber LPN on 7/2/7124 at 2:39 PM

## 2020-07-08 VITALS
OXYGEN SATURATION: 94 % | DIASTOLIC BLOOD PRESSURE: 88 MMHG | TEMPERATURE: 98 F | HEIGHT: 63 IN | SYSTOLIC BLOOD PRESSURE: 122 MMHG | WEIGHT: 176 LBS | BODY MASS INDEX: 31.18 KG/M2 | HEART RATE: 91 BPM | RESPIRATION RATE: 18 BRPM

## 2020-07-08 PROCEDURE — 6370000000 HC RX 637 (ALT 250 FOR IP): Performed by: PSYCHIATRY & NEUROLOGY

## 2020-07-08 PROCEDURE — 6370000000 HC RX 637 (ALT 250 FOR IP): Performed by: HOSPITALIST

## 2020-07-08 PROCEDURE — 99239 HOSP IP/OBS DSCHRG MGMT >30: CPT | Performed by: PSYCHIATRY & NEUROLOGY

## 2020-07-08 RX ADMIN — OXCARBAZEPINE 300 MG: 300 TABLET, FILM COATED ORAL at 09:53

## 2020-07-08 RX ADMIN — LURASIDONE HYDROCHLORIDE 40 MG: 40 TABLET, FILM COATED ORAL at 09:53

## 2020-07-08 RX ADMIN — PROPRANOLOL HYDROCHLORIDE 20 MG: 20 TABLET ORAL at 09:53

## 2020-07-08 RX ADMIN — LEVOTHYROXINE SODIUM 137 MCG: 0.03 TABLET ORAL at 06:22

## 2020-07-08 RX ADMIN — SUCRALFATE 1 G: 1 TABLET ORAL at 09:53

## 2020-07-08 RX ADMIN — IBUPROFEN 800 MG: 800 TABLET ORAL at 09:52

## 2020-07-08 RX ADMIN — PANTOPRAZOLE SODIUM 40 MG: 40 TABLET, DELAYED RELEASE ORAL at 06:22

## 2020-07-08 RX ADMIN — GABAPENTIN 300 MG: 300 CAPSULE ORAL at 09:53

## 2020-07-08 RX ADMIN — CLONAZEPAM 0.5 MG: 0.5 TABLET ORAL at 09:52

## 2020-07-08 ASSESSMENT — PAIN SCALES - GENERAL: PAINLEVEL_OUTOF10: 9

## 2020-07-08 NOTE — GROUP NOTE
Group Therapy Note    Date: 7/7/2020    Group Start Time: 2110  Group End Time: 2135  Group Topic: Wrap-Up    [facility] 3[facility] EPIFANIO Grewal        Group Therapy Note    Attendees: 10/24         Patient's Goal:  \"stay calm\"    Notes:  Patient feels she met her goal. Patient was very supportive of another patient while he shared an event with the past with the group. Patient participated in guided meditation.     Status After Intervention:  Improved    Participation Level: Interactive    Participation Quality: Appropriate, Attentive, Sharing and Supportive      Speech:  normal      Thought Process/Content: Logical      Affective Functioning: Congruent      Mood: euthymic      Level of consciousness:  Alert and Attentive      Response to Learning: Progressing to goal      Endings: None Reported    Modes of Intervention: Support      Discipline Responsible: Verified Identity Pass      Signature:  Fredy Grewal

## 2020-07-08 NOTE — GROUP NOTE
Group Therapy Note    Date: 7/8/2020    Group Start Time: 1000  Group End Time: 1100  Group Topic: Psychoeducation    [facility]    Tish Willis, CTRS        Group Therapy Note    Attendees: 9         Patient's Goal:  \"to go home\"    Notes:  Pt. attended the 1000 skill group. Happy to be going home. Finished project and was pleased. Status After Intervention:  Improved    Participation Level:  Active Listener and Interactive    Participation Quality: Appropriate, Attentive and Sharing      Speech:  normal      Thought Process/Content: Logical      Affective Functioning: Congruent      Mood: calm, happy      Level of consciousness:  Alert, Oriented x4 and Attentive      Response to Learning: Progressing to goal      Endings: None Reported    Modes of Intervention: Education, Support, Socialization and Activity      Discipline Responsible: Psychoeducational Specialist      Signature:  Marcus Yanez

## 2020-07-08 NOTE — DISCHARGE SUMMARY
DISCHARGE SUMMARY      Patient ID:  Reji Nixon  [id number]  38 y.o.  1975    Patient Location:   [address] [place] [address]-        Provider Location ([facility]/State):   Guillermo Carr date: 7/3/2020    Discharge date and time: 2020    Admitting Physician: Fiona Betts MD     Discharge Physician: Dr Alicja Acevedo MD    Admission Diagnoses: Severe major depression without psychotic features ([facility] [F32.2]    Admission Condition: poor    Discharged Condition: stable    Admission Circumstance:     37 yo single female a mother of 4 children 2  and 2 adult children   She is on [address][de-identified] suicidal ideations with a plan in a setting of a manic episode,      Stressors: multiple medical problems:  Will need back surgery soon  States they diagnosed her with a liver tumor  Very somatically preoccupied  Son just left back for an airforce and she misses her children who live in a different states (son and daughter)  She lost a daughter to suicide 11 years ago when the daughter was 12years old  HPI:  Patient reports feeling tired . States all the above stressors made her feels sad and she went to a bar to drink. She was crying and driving and her erratic driving caught attention of others who called the police on her. She has not been sleeping,  her thoughts race and she thinks of all kinds of projects unfortunately she also thinks of tragic events and they race in a [place]  She has increased goal directed activity wants to write a book, wants to help others     Patient reports   Missing her children, losing a purpose to live feels overwhelmed  She claims she takes her medications and was diagnosed with bipolar disorder     Past admissions: patient reports previous admissions to a psychiatric unit, does not remember how many        Breast Cancer-related family history is not on file.  Family psychiatric hx:  Oldest daughter hung herself at the age of 12   father's side : schizophrenia and bipolar disorder      PAST MEDICAL/PSYCHIATRIC HISTORY:   No past medical history on file. FAMILY/SOCIAL HISTORY:  No family history on file.   Social History     Socioeconomic History    Marital status:      Spouse name: Not on file    Number of children: Not on file    Years of education: Not on file    Highest education level: Not on file   Occupational History    Not on file   Social Needs    Financial resource strain: Not on file    Food insecurity     Worry: Not on file     Inability: Not on file    Transportation needs     Medical: Not on file     Non-medical: Not on file   Tobacco Use    Smoking status: Not on file   Substance and Sexual Activity    Alcohol use: Not Currently    Drug use: Not on file    Sexual activity: Not on file   Lifestyle    Physical activity     Days per week: Not on file     Minutes per session: Not on file    Stress: Not on file   Relationships    Social connections     Talks on phone: Not on file     Gets together: Not on file     Attends Muslim service: Not on file     Active member of club or organization: Not on file     Attends meetings of clubs or organizations: Not on file     Relationship status: Not on file    Intimate partner violence     Fear of current or ex partner: Not on file     Emotionally abused: Not on file     Physically abused: Not on file     Forced sexual activity: Not on file   Other Topics Concern    Not on file   Social History Narrative    Not on file       MEDICATIONS:    Current Facility-Administered Medications:     lurasidone (LATUDA) tablet 40 mg, 40 mg, Oral, Daily, Les Ribeiro MD, 40 mg at 07/08/20 0953    zolpidem (AMBIEN) tablet 10 mg, 10 mg, Oral, Nightly PRN, Les Ribeiro MD, 10 mg at 07/07/20 2211    haloperidol lactate (HALDOL) injection 5 mg, 5 mg, Intramuscular, Q6H PRN **OR** haloperidol (HALDOL) tablet 5 mg, 5 mg, Oral, Q6H PRN, Michelle Lewis MD    hydrOXYzine (VISTARIL) capsule 50 mg, 50 mg, Oral, Q6H PRN, 50 mg at 07/07/20 1830 **OR** hydrOXYzine (VISTARIL) injection 50 mg, 50 mg, Intramuscular, Q6H PRN, Ousmane De León MD    acetaminophen (TYLENOL) tablet 650 mg, 650 mg, Oral, Q4H PRN, Renny De León MD, 650 mg at 07/05/20 1724    benztropine mesylate (COGENTIN) injection 2 mg, 2 mg, Intramuscular, BID PRN, Ousmane De León MD    magnesium hydroxide (MILK OF MAGNESIA) 400 MG/5ML suspension 30 mL, 30 mL, Oral, Daily PRN, Ousmane De León MD    nicotine polacrilex (COMMIT) lozenge 2 mg, 2 mg, Oral, Q2H PRN, Ousmane De León MD    clonazePAM (KLONOPIN) tablet 0.5 mg, 0.5 mg, Oral, TID, Renny De León MD, 0.5 mg at 07/08/20 5538    OXcarbazepine (TRILEPTAL) tablet 300 mg, 300 mg, Oral, BID, Renny De León MD, 300 mg at 07/08/20 0766    cyclobenzaprine (FLEXERIL) tablet 10 mg, 10 mg, Oral, Nightly, Tracee Gum, APRN - CNP, 10 mg at 07/07/20 2052    gabapentin (NEURONTIN) capsule 300 mg, 300 mg, Oral, TID, Tracee Gum, APRN - CNP, 300 mg at 07/08/20 2484    ibuprofen (ADVIL;MOTRIN) tablet 800 mg, 800 mg, Oral, BID, Tracee Gum, APRN - CNP, 800 mg at 07/08/20 9979    levothyroxine (SYNTHROID) tablet 137 mcg, 137 mcg, Oral, Daily, Tracee Gum, APRN - CNP, 137 mcg at 07/08/20 0622    pantoprazole (PROTONIX) tablet 40 mg, 40 mg, Oral, QAM AC, Michelle Pentito, APRN - CNP, 40 mg at 07/08/20 0622    propranolol (INDERAL) tablet 20 mg, 20 mg, Oral, TID, Tracee Gum, APRN - CNP, 20 mg at 07/08/20 0953    sucralfate (CARAFATE) tablet 1 g, 1 g, Oral, 4x Daily, Tracee Gum, APRN - CNP, 1 g at 07/08/20 8361    Examination:  /88   Pulse 91   Temp 98 °F (36.7 °C) (Oral)   Resp 18   Ht 5' 3\" (1.6 m)   Wt 176 lb (79.8 kg)   SpO2 94%   BMI 31.18 kg/m²   Gait - steady    HOSPITAL COURSE[de-identified]  Following admission to the hospital, patient had a complete physical exam and blood work up  Patient was

## 2022-10-11 ENCOUNTER — APPOINTMENT (OUTPATIENT)
Dept: GENERAL RADIOLOGY | Age: 47
End: 2022-10-11
Payer: COMMERCIAL

## 2022-10-11 ENCOUNTER — HOSPITAL ENCOUNTER (EMERGENCY)
Age: 47
Discharge: PSYCHIATRIC OR SUBSTANCE USE FACILITY | End: 2022-10-12
Payer: COMMERCIAL

## 2022-10-11 VITALS
RESPIRATION RATE: 16 BRPM | HEIGHT: 64 IN | OXYGEN SATURATION: 100 % | DIASTOLIC BLOOD PRESSURE: 81 MMHG | BODY MASS INDEX: 28.17 KG/M2 | SYSTOLIC BLOOD PRESSURE: 128 MMHG | WEIGHT: 165 LBS | TEMPERATURE: 97.8 F | HEART RATE: 100 BPM

## 2022-10-11 DIAGNOSIS — F31.60 BIPOLAR AFFECTIVE DISORDER, CURRENT EPISODE MIXED, CURRENT EPISODE SEVERITY UNSPECIFIED (HCC): Primary | ICD-10-CM

## 2022-10-11 LAB
ACETAMINOPHEN LEVEL: <5 UG/ML (ref 10–30)
ALBUMIN SERPL-MCNC: 5.1 G/DL (ref 3.5–4.6)
ALP BLD-CCNC: 48 U/L (ref 40–130)
ALT SERPL-CCNC: 10 U/L (ref 0–33)
AMPHETAMINE SCREEN, URINE: ABNORMAL
ANION GAP SERPL CALCULATED.3IONS-SCNC: 10 MEQ/L (ref 9–15)
AST SERPL-CCNC: 13 U/L (ref 0–35)
BARBITURATE SCREEN URINE: ABNORMAL
BASOPHILS ABSOLUTE: 0 K/UL (ref 0–0.2)
BASOPHILS RELATIVE PERCENT: 0.5 %
BENZODIAZEPINE SCREEN, URINE: POSITIVE
BILIRUB SERPL-MCNC: <0.2 MG/DL (ref 0.2–0.7)
BILIRUBIN URINE: NEGATIVE
BLOOD, URINE: NEGATIVE
BUN BLDV-MCNC: 7 MG/DL (ref 6–20)
CALCIUM SERPL-MCNC: 9.2 MG/DL (ref 8.5–9.9)
CANNABINOID SCREEN URINE: POSITIVE
CHLORIDE BLD-SCNC: 101 MEQ/L (ref 95–107)
CHOLESTEROL, TOTAL: 198 MG/DL (ref 0–199)
CLARITY: CLEAR
CO2: 28 MEQ/L (ref 20–31)
COCAINE METABOLITE SCREEN URINE: ABNORMAL
COLOR: YELLOW
CREAT SERPL-MCNC: 0.69 MG/DL (ref 0.5–0.9)
EOSINOPHILS ABSOLUTE: 0.1 K/UL (ref 0–0.7)
EOSINOPHILS RELATIVE PERCENT: 2 %
ETHANOL PERCENT: NORMAL G/DL
ETHANOL: <10 MG/DL (ref 0–0.08)
FENTANYL SCREEN, URINE: ABNORMAL
GFR AFRICAN AMERICAN: >60
GFR NON-AFRICAN AMERICAN: >60
GLOBULIN: 3.6 G/DL (ref 2.3–3.5)
GLUCOSE BLD-MCNC: 85 MG/DL (ref 70–99)
GLUCOSE URINE: NEGATIVE MG/DL
HCG(URINE) PREGNANCY TEST: NEGATIVE
HCT VFR BLD CALC: 37.8 % (ref 37–47)
HDLC SERPL-MCNC: 54 MG/DL (ref 40–59)
HEMOGLOBIN: 12.6 G/DL (ref 12–16)
KETONES, URINE: NEGATIVE MG/DL
LDL CHOLESTEROL CALCULATED: 130 MG/DL (ref 0–129)
LEUKOCYTE ESTERASE, URINE: NEGATIVE
LYMPHOCYTES ABSOLUTE: 1.7 K/UL (ref 1–4.8)
LYMPHOCYTES RELATIVE PERCENT: 37.2 %
Lab: ABNORMAL
MCH RBC QN AUTO: 28.3 PG (ref 27–31.3)
MCHC RBC AUTO-ENTMCNC: 33.4 % (ref 33–37)
MCV RBC AUTO: 84.8 FL (ref 82–100)
METHADONE SCREEN, URINE: ABNORMAL
MONOCYTES ABSOLUTE: 0.4 K/UL (ref 0.2–0.8)
MONOCYTES RELATIVE PERCENT: 8.1 %
NEUTROPHILS ABSOLUTE: 2.4 K/UL (ref 1.4–6.5)
NEUTROPHILS RELATIVE PERCENT: 52.2 %
NITRITE, URINE: NEGATIVE
OPIATE SCREEN URINE: ABNORMAL
OXYCODONE URINE: ABNORMAL
PDW BLD-RTO: 14.2 % (ref 11.5–14.5)
PH UA: 7 (ref 5–9)
PHENCYCLIDINE SCREEN URINE: ABNORMAL
PLATELET # BLD: 332 K/UL (ref 130–400)
POTASSIUM SERPL-SCNC: 3.2 MEQ/L (ref 3.4–4.9)
PROPOXYPHENE SCREEN: ABNORMAL
PROTEIN UA: NEGATIVE MG/DL
RBC # BLD: 4.46 M/UL (ref 4.2–5.4)
SALICYLATE, SERUM: <0.3 MG/DL (ref 15–30)
SARS-COV-2, NAAT: NOT DETECTED
SODIUM BLD-SCNC: 139 MEQ/L (ref 135–144)
SPECIFIC GRAVITY UA: 1.01 (ref 1–1.03)
TOTAL CK: 107 U/L (ref 0–170)
TOTAL PROTEIN: 8.7 G/DL (ref 6.3–8)
TRIGL SERPL-MCNC: 68 MG/DL (ref 0–150)
TROPONIN: <0.01 NG/ML (ref 0–0.01)
TSH SERPL DL<=0.05 MIU/L-ACNC: 0.56 UIU/ML (ref 0.44–3.86)
URINE REFLEX TO CULTURE: NORMAL
UROBILINOGEN, URINE: 0.2 E.U./DL
WBC # BLD: 4.7 K/UL (ref 4.8–10.8)

## 2022-10-11 PROCEDURE — 85025 COMPLETE CBC W/AUTO DIFF WBC: CPT

## 2022-10-11 PROCEDURE — 82077 ASSAY SPEC XCP UR&BREATH IA: CPT

## 2022-10-11 PROCEDURE — 6370000000 HC RX 637 (ALT 250 FOR IP): Performed by: PHYSICIAN ASSISTANT

## 2022-10-11 PROCEDURE — 96372 THER/PROPH/DIAG INJ SC/IM: CPT

## 2022-10-11 PROCEDURE — 80061 LIPID PANEL: CPT

## 2022-10-11 PROCEDURE — 71045 X-RAY EXAM CHEST 1 VIEW: CPT

## 2022-10-11 PROCEDURE — 81003 URINALYSIS AUTO W/O SCOPE: CPT

## 2022-10-11 PROCEDURE — 84484 ASSAY OF TROPONIN QUANT: CPT

## 2022-10-11 PROCEDURE — 84443 ASSAY THYROID STIM HORMONE: CPT

## 2022-10-11 PROCEDURE — 80307 DRUG TEST PRSMV CHEM ANLYZR: CPT

## 2022-10-11 PROCEDURE — 6360000002 HC RX W HCPCS: Performed by: PHYSICIAN ASSISTANT

## 2022-10-11 PROCEDURE — 80179 DRUG ASSAY SALICYLATE: CPT

## 2022-10-11 PROCEDURE — 93005 ELECTROCARDIOGRAM TRACING: CPT

## 2022-10-11 PROCEDURE — 80053 COMPREHEN METABOLIC PANEL: CPT

## 2022-10-11 PROCEDURE — 36415 COLL VENOUS BLD VENIPUNCTURE: CPT

## 2022-10-11 PROCEDURE — 99285 EMERGENCY DEPT VISIT HI MDM: CPT

## 2022-10-11 PROCEDURE — 80143 DRUG ASSAY ACETAMINOPHEN: CPT

## 2022-10-11 PROCEDURE — 87635 SARS-COV-2 COVID-19 AMP PRB: CPT

## 2022-10-11 PROCEDURE — 84703 CHORIONIC GONADOTROPIN ASSAY: CPT

## 2022-10-11 PROCEDURE — 82550 ASSAY OF CK (CPK): CPT

## 2022-10-11 RX ORDER — PANTOPRAZOLE SODIUM 40 MG/1
40 TABLET, DELAYED RELEASE ORAL
Status: DISCONTINUED | OUTPATIENT
Start: 2022-10-12 | End: 2022-10-12 | Stop reason: HOSPADM

## 2022-10-11 RX ORDER — LORAZEPAM 1 MG/1
1 TABLET ORAL ONCE
Status: COMPLETED | OUTPATIENT
Start: 2022-10-11 | End: 2022-10-11

## 2022-10-11 RX ORDER — POTASSIUM BICARBONATE 25 MEQ/1
25 TABLET, EFFERVESCENT ORAL ONCE
Status: DISCONTINUED | OUTPATIENT
Start: 2022-10-11 | End: 2022-10-12 | Stop reason: HOSPADM

## 2022-10-11 RX ORDER — HALOPERIDOL 5 MG/ML
5 INJECTION INTRAMUSCULAR ONCE
Status: COMPLETED | OUTPATIENT
Start: 2022-10-11 | End: 2022-10-11

## 2022-10-11 RX ORDER — LORAZEPAM 2 MG/ML
1 INJECTION INTRAMUSCULAR ONCE
Status: COMPLETED | OUTPATIENT
Start: 2022-10-11 | End: 2022-10-11

## 2022-10-11 RX ORDER — ACETAMINOPHEN 500 MG
1000 TABLET ORAL ONCE
Status: COMPLETED | OUTPATIENT
Start: 2022-10-11 | End: 2022-10-11

## 2022-10-11 RX ORDER — DIPHENHYDRAMINE HYDROCHLORIDE 50 MG/ML
50 INJECTION INTRAMUSCULAR; INTRAVENOUS ONCE
Status: COMPLETED | OUTPATIENT
Start: 2022-10-11 | End: 2022-10-11

## 2022-10-11 RX ADMIN — ACETAMINOPHEN 1000 MG: 500 TABLET ORAL at 18:03

## 2022-10-11 RX ADMIN — HALOPERIDOL LACTATE 5 MG: 5 INJECTION, SOLUTION INTRAMUSCULAR at 18:26

## 2022-10-11 RX ADMIN — LORAZEPAM 1 MG: 1 TABLET ORAL at 18:03

## 2022-10-11 RX ADMIN — DIPHENHYDRAMINE HYDROCHLORIDE 50 MG: 50 INJECTION, SOLUTION INTRAMUSCULAR; INTRAVENOUS at 18:25

## 2022-10-11 RX ADMIN — LORAZEPAM 1 MG: 2 INJECTION INTRAMUSCULAR; INTRAVENOUS at 18:25

## 2022-10-11 ASSESSMENT — ENCOUNTER SYMPTOMS
ANAL BLEEDING: 0
VOMITING: 0
NAUSEA: 0
APNEA: 0
EYE DISCHARGE: 0
ABDOMINAL DISTENTION: 0
BACK PAIN: 1
VOICE CHANGE: 0

## 2022-10-11 ASSESSMENT — PAIN DESCRIPTION - LOCATION: LOCATION: GENERALIZED

## 2022-10-11 ASSESSMENT — PAIN SCALES - GENERAL: PAINLEVEL_OUTOF10: 10

## 2022-10-11 ASSESSMENT — PATIENT HEALTH QUESTIONNAIRE - PHQ9: SUM OF ALL RESPONSES TO PHQ QUESTIONS 1-9: 12

## 2022-10-11 NOTE — ED NOTES
Patient changed into psych safe clothing. COVID obtained and sent to lab.       Laureano Henry RN  10/11/22 3729

## 2022-10-11 NOTE — ED NOTES
Patient is hyperverbal with pressured speech and unable to sit still in bed during assessment. She reports extreme anxiety and is starting to become agitated and disorganized throughout assessment.      Amita Lindsay RN  10/11/22 8131

## 2022-10-11 NOTE — ED NOTES
Chart to ER Zone 2 Medical Staff & Lab notified of need of blood draw.      Michael Paul RN  10/11/22 0882

## 2022-10-11 NOTE — ED TRIAGE NOTES
Patient presented on a pink slip from the [address] [place] [address] for suicidal ideation with a plan to drive her car into the [facility]. Patient presents as manic and has pressured speech.

## 2022-10-11 NOTE — ED PROVIDER NOTES
[facility] ED  eMERGENCY dEPARTMENT eNCOUnter      Pt Name: Frank Queen  MRN: [id number]  Armsarturogfsudhakar 1975  Date of evaluation: 10/11/2022  Provider: Alejo Carranza Dr       Chief Complaint   Patient presents with    Suicidal     Pt pink slipped by the [facility] center for SI with a plan          HISTORY OF PRESENT ILLNESS   (Location/Symptom, Timing/Onset,Context/Setting, Quality, Duration, Modifying Factors, Severity)  Note limiting factors. Frank Queen is a 40 y.o. female who presents to the emergency department patient presents with suicidal ideation with a plan patient notes she has chronic pain and has not taking any of her medicine with exception of her thyroid medicine. She notes that she has had chest pain since her last panic attack. Patient currently manic in emergency room symptoms moderate in severity she denies fever chills nausea vomiting cough congestion. Denies any self injury. HPI    NursingNotes were reviewed. REVIEW OF SYSTEMS    (2-9 systems for level 4, 10 or more for level 5)     Review of Systems   Constitutional:  Negative for activity change, appetite change and unexpected weight change. HENT:  Negative for ear discharge, nosebleeds and voice change. Eyes:  Negative for discharge. Respiratory:  Negative for apnea. Cardiovascular:  Negative for chest pain. Gastrointestinal:  Negative for abdominal distention, anal bleeding, nausea and vomiting. Genitourinary:  Negative for dysuria and hematuria. Musculoskeletal:  Positive for back pain and myalgias. Negative for joint swelling. Skin:  Negative for pallor. Neurological:  Negative for seizures and facial asymmetry. Hematological:  Does not bruise/bleed easily. Psychiatric/Behavioral:  Positive for suicidal ideas. Negative for self-injury. All other systems reviewed and are negative.     Except as noted above the remainder of the review of systems was reviewed and negative. PAST MEDICAL HISTORY     Past Medical History:   Diagnosis Date    Chronic pain          SURGICALHISTORY     No past surgical history on file. CURRENT MEDICATIONS       Previous Medications    ALPRAZOLAM (XANAX) 1 MG TABLET    Take 2 mg by mouth 4 times daily. CYCLOBENZAPRINE (FLEXERIL) 10 MG TABLET    Take 10 mg by mouth nightly    GABAPENTIN (NEURONTIN) 300 MG CAPSULE    Take 300 mg by mouth 3 times daily. LEVOTHYROXINE (SYNTHROID) 137 MCG TABLET    Take 150 mcg by mouth every morning (before breakfast) Patient brought in medication bottle filled for 90 day supply 8/11/2022    LURASIDONE (LATUDA) 40 MG TABS TABLET    Take 1 tablet by mouth daily    OMEPRAZOLE (PRILOSEC) 20 MG DELAYED RELEASE CAPSULE    Take 40 mg by mouth Daily    OXCARBAZEPINE (TRILEPTAL) 300 MG TABLET    Take 300 mg by mouth 2 times daily Once at breakfast once at two pm    PROPRANOLOL (INDERAL) 20 MG TABLET    Take 20 mg by mouth 3 times daily    SUCRALFATE (CARAFATE) 1 GM TABLET    Take 1 g by mouth 4 times daily    ZOLPIDEM (AMBIEN) 10 MG TABLET    Take 10 mg by mouth nightly as needed for Sleep. Meloxicam, Pomegranate (punica granatum), and Seroquel [quetiapine fumarate]    FAMILY HISTORY     No family history on file.        SOCIAL HISTORY       Social History     Socioeconomic History    Marital status:    Substance and Sexual Activity    Alcohol use: Not Currently    Drug use: Yes     Types: Marijuana (Weed)     Comment: Daily use: Medical Marijuana card       SCREENINGS   [place] Coma Scale  Eye Opening: Spontaneous  Best Verbal Response: Oriented  Best Motor Response: Obeys commands  [place] Coma Scale Score: 15  Ebola Virus Disease (EVD) Screening   Temp: 97.8 °F (36.6 °C)  CIWA Assessment  BP: 128/81  Heart Rate: 100    PHYSICAL EXAM    (up to 7 for level 4, 8 or more for level 5)     ED Triage Vitals [10/11/22 1635]   BP Temp Temp Source Heart Rate Resp SpO2 Height Weight   128/81 97.8 °F (36.6 °C) Temporal 100 16 100 % 5' 4\" (1.626 m) 165 lb (74.8 kg)       Physical Exam  Vitals and nursing note reviewed. Constitutional:       General: She is not in acute distress. Appearance: She is well-developed. HENT:      Head: Normocephalic and atraumatic. Right Ear: External ear normal.      Left Ear: External ear normal.      Nose: Nose normal.      Mouth/Throat:      Mouth: Mucous membranes are moist.   Eyes:      General:         Right eye: No discharge. Left eye: No discharge. Pupils: Pupils are equal, round, and reactive to light. Cardiovascular:      Rate and Rhythm: Normal rate and regular rhythm. Heart sounds: Normal heart sounds. Pulmonary:      Effort: Pulmonary effort is normal. No respiratory distress. Breath sounds: Normal breath sounds. No stridor. Abdominal:      General: Bowel sounds are normal. There is no distension. Palpations: Abdomen is soft. Musculoskeletal:         General: Normal range of motion. Cervical back: Normal range of motion and neck supple. Skin:     General: Skin is warm. Findings: No erythema. Neurological:      Mental Status: She is alert and oriented to person, place, and time. Psychiatric:      Comments: Manic       RESULTS     EKG: All EKG's are interpreted by the Emergency Department Physician who either signs or Co-signsthis chart in the absence of a cardiologist.    EKG normal sinus rhythm rate 86  ms QRS 88 ms  ms. Negative ST segment elevation    RADIOLOGY:   Non-plain filmimages such as CT, Ultrasound and MRI are read by the radiologist. Plain radiographic images are visualized and preliminarily interpreted by the emergency physician with the below findings:  See final report       Interpretation per the Radiologist below, if available at the time ofthis note:    XR CHEST PORTABLE   Final Result   No acute process.                ED BEDSIDE ULTRASOUND:   Performed by ED Physician - none    LABS:  Labs Reviewed   ACETAMINOPHEN LEVEL - Abnormal; Notable for the following components:       Result Value    Acetaminophen Level <5 (*)     All other components within normal limits   CBC WITH AUTO DIFFERENTIAL - Abnormal; Notable for the following components:    WBC 4.7 (*)     All other components within normal limits   COMPREHENSIVE METABOLIC PANEL - Abnormal; Notable for the following components:    Potassium 3.2 (*)     Total Protein 8.7 (*)     Albumin 5.1 (*)     Globulin 3.6 (*)     All other components within normal limits   LIPID PANEL - Abnormal; Notable for the following components:    LDL Calculated 130 (*)     All other components within normal limits   SALICYLATE LEVEL - Abnormal; Notable for the following components:    Salicylate, Serum <8.9 (*)     All other components within normal limits   URINE DRUG SCREEN - Abnormal; Notable for the following components:    Benzodiazepine Screen, Urine POSITIVE (*)     Cannabinoid Scrn, Ur POSITIVE (*)     All other components within normal limits   COVID-19, RAPID   ETHANOL   PREGNANCY, URINE   TSH   URINALYSIS WITH REFLEX TO CULTURE   CK   TROPONIN       All other labs were within normal range or not returned as of this dictation.     EMERGENCY DEPARTMENT COURSE and DIFFERENTIAL DIAGNOSIS/MDM:   Vitals:    Vitals:    10/11/22 1635   BP: 128/81   Pulse: 100   Resp: 16   Temp: 97.8 °F (36.6 °C)   TempSrc: Temporal   SpO2: 100%   Weight: 165 lb (74.8 kg)   Height: 5' 4\" (1.626 m)            MDM  Number of Diagnoses or Management Options  Bipolar affective disorder, current episode mixed, current episode severity unspecified ([facility]  Diagnosis management comments: Medically stable       Amount and/or Complexity of Data Reviewed  Clinical lab tests: reviewed and ordered  Tests in the radiology section of CPT®: ordered        CRITICAL CARE TIME          CONSULTS:  None    PROCEDURES:  Unless otherwise noted below, none     Procedures    FINAL IMPRESSION 1. Bipolar affective disorder, current episode mixed, current episode severity unspecified [facility])          DISPOSITION/PLAN   DISPOSITION Decision To Transfer 10/11/2022 10:24:32 PM      PATIENT REFERRED TO:  No follow-up provider specified.     DISCHARGE MEDICATIONS:  New Prescriptions    No medications on file          (Please note that portions of this note were completed with a voice recognition program.  Efforts were made to edit the dictations but occasionally words are mis-transcribed.)    Shade Mosqueda PA-C (electronically signed)  Attending Emergency Physician        Shade Mosqueda PA-C  10/11/22 2018       Shade Mosqueda PA-C  10/13/22 5163

## 2022-10-11 NOTE — ED NOTES
Patient states she is unable to give urine at this time. Ice water and ginger ale provided to patient.       Alejandra Montes RN  10/11/22 5731

## 2022-10-11 NOTE — ED NOTES
Provisional Diagnosis:     Mixed Bipolar affective disorder, per chart review    Psychosocial and Contextual Factors:     Patient reports having two children and living with her daughter and grandchild in [place]. Reports her medications have changes and she \"fired\" her doctor; It is unclear if patient has been compliant with her medications  Last admitted to [address] [place], [place] [address] 7/3/20 - 7/8/20    C-SSRS Summary:    C-SSRS Suicide Screening -   1) Within the past month, have you wished you were dead or wished you could go to sleep and not wake up? : Yes    2) Have you actually had any thoughts of killing yourself? : Yes    3) Have you been thinking about how you might kill yourself? : Yes    4) Have you had these thoughts and had some intention of acting on them? : Yes   5) Have you started to work out or worked out the details of how to kill yourself? Do you intend to carry out this plan? : No    6) Have you ever done anything, started to do anything, or prepared to do anything to end your life?: No/ unable     Risk of Suicide: High Risk     Patient: Poor historian. Presents as manic, restless and anxious. Cooperative and pleasant initially, however tearful and becomes agitated as mental health symptoms are discussed. Family: Reports her family is supportive. Agency: States she has a doctor at [facility]. Substance Abuse:  Patient reports using Medical Marijuana daily for pain. Denies all other substance use including illicit and alcohol. TOX positive for Benzodiazepines and Marijuana. ETOH negative. Present Suicidal Behavior:    Verbal: Reports suicidal thoughts due to her pain and medical conditions. Patient is difficult to follow as she describes these concerns. [place] she had intent to do so prior to arrival to the ED. Attempt: Patient denies.      Past Suicidal Behavior:   Verbal: Per chart review review, patient has history of suicidal ideation. Attempt: Patient has history of prior suicide attempts. Self-Injurious/Self-Mutilation:  Patient denies. Violence Current or Past:  Patient denies. Trauma Identified:    Unable to assess as patient does not answer. Protective Factors:    Social support of family  Identifies reasons for living  Will to live    Risk Factors:    Previous psychiatric treatment  Highly impulsive behavior    Clinical Summary:    Patient presented to the ED via EMS on a pink slip from [facility] for suicidal ideation [place]. Patient reports she had intent to act on her thoughts, but reports she called her , before she did so, who suggested going to the [address] [place] [address] to get help first. She reports she no longer has intent to act on her suicidal thoughts, but feels overwhelmed due to her medical concerns, pain, lack of sleep and due to the death of her step father yesterday. Patient presents as manic, with loud, rapid, pressured speech. Patient is tangential and disorganized. She denies HI and hallucinations. Patient was unable to report what medications she has been consistently taking or when she stopped taking any of her medications. Patient did have good insight reporting \"I just know that when I get like this I need to get back on my stuff and come to the hospital\". Sheela Later states she has not slept in 3 days. Patient became increasingly restless, elevated, and reported feeling agitated throughout the assessment. She is somatically focused throughout the assessment. She became difficult to interrupt due to the rate of her speech. She was unable to further tolerate assessment questions.       Level of Care Disposition:    Pending per Dr. Angela Delgadillo, RN  10/11/22 1924

## 2022-10-11 NOTE — ED NOTES
Patient visible restless, and tearful in bed as she is rocking back and forth. Patient became upset while describing her mental health symptoms \"please give me something to work now, this won't work\". Qi Schulz PA-C notified of patients escalating anxiety and agitation and new orders obtained.       Amita Lidnsay RN  10/11/22 7903

## 2022-10-12 PROCEDURE — 6370000000 HC RX 637 (ALT 250 FOR IP): Performed by: PHYSICIAN ASSISTANT

## 2022-10-12 RX ORDER — IBUPROFEN 800 MG/1
800 TABLET ORAL ONCE
Status: COMPLETED | OUTPATIENT
Start: 2022-10-12 | End: 2022-10-12

## 2022-10-12 RX ORDER — HYDROXYZINE PAMOATE 50 MG/1
50 CAPSULE ORAL ONCE
Status: COMPLETED | OUTPATIENT
Start: 2022-10-12 | End: 2022-10-12

## 2022-10-12 RX ORDER — LEVOTHYROXINE SODIUM 0.12 MG/1
125 TABLET ORAL ONCE
Status: COMPLETED | OUTPATIENT
Start: 2022-10-12 | End: 2022-10-12

## 2022-10-12 RX ADMIN — IBUPROFEN 800 MG: 800 TABLET, FILM COATED ORAL at 07:46

## 2022-10-12 RX ADMIN — LEVOTHYROXINE SODIUM 125 MCG: 0.12 TABLET ORAL at 07:49

## 2022-10-12 RX ADMIN — PANTOPRAZOLE SODIUM 40 MG: 40 TABLET, DELAYED RELEASE ORAL at 07:53

## 2022-10-12 RX ADMIN — HYDROXYZINE PAMOATE 50 MG: 50 CAPSULE ORAL at 07:46

## 2022-10-12 ASSESSMENT — PAIN DESCRIPTION - LOCATION: LOCATION: PELVIS;KNEE

## 2022-10-12 ASSESSMENT — PAIN SCALES - GENERAL: PAINLEVEL_OUTOF10: 10

## 2022-10-12 NOTE — ED NOTES
Spoke with Daniele Wilhelm at [facility] per [facility] pt only has straight medicaid. Called registration and per registration pt does have [facility], however it will say rejected as they are going through a buyout and if you compare the policy numbers it shows them as the same there for it is active.      Sheila Mejia, RN  10/12/22 1675

## 2022-10-12 NOTE — ED NOTES
Patient appears to be  sleeping respirations are even and unlabored no distress noted at this time.        Roni Ireland RN  10/12/22 1937

## 2022-10-12 NOTE — ED NOTES
Spoke with Angeles Villanueva at [facility] for placement.      Marino Mcqueen RN  10/11/22 2053

## 2022-10-12 NOTE — ED NOTES
Received call from Daniele Wilhelm at [facility] stating that Chel Carl is requesting pink slip be faxed to them @ [phone]. When received will call back with accepting information.         Hipolito Lomax RN  10/12/22 5035

## 2022-10-12 NOTE — ED NOTES
Tray ordered. patient resting on bed. Spontaneous position changes. No s/s of distress. Glen Bradshaw  [facility]  10/12/22 8337

## 2022-10-12 NOTE — ED NOTES
To [facility] via [address]. [facility] Gee Alberto  [facility]  10/12/22 8140

## 2022-10-12 NOTE — ED NOTES
Patient appears to be  sleeping respirations are even and unlabored no distress noted at this time. \     Claudetta Boss, RN  10/12/22 2465

## 2022-10-12 NOTE — ED NOTES
Patient appears to be  sleeping respirations are even and unlabored no distress noted at this time.        Mirela Koch RN  10/12/22 5020

## 2022-10-12 NOTE — ED NOTES
Spoke with Dr Yue Marin: refer pt out for dual dx treatment r/t Xanax, preferably towards [place] as that's where her services are already set up.       Luisa Henao RN  10/11/22 2010

## 2022-10-12 NOTE — ED NOTES
Spoke with Keena RIGGINS at [facility] requesting EKG.       EKG faxed to [facility] OF PAZRONY Salinas RN  10/12/22 1616

## 2022-10-12 NOTE — ED NOTES
Patient request to be called by the name ,\" Bird\"     Prasanth Wilkinson.  Meenu Harris  10/12/22 0800

## 2022-10-12 NOTE — ED NOTES
Pt OOBx1 to BR independently. Given warm blanket and returned to assigned room #4 and laid down. Pt specifically asking for Ativan to help her sleep before returning to her room. Pt does not have a prescription for Ativan and only received one dose while hospitalized by previous shift. Pt has known substance abuse issues and will consult with doctor. Pt is currently lying in bed, rr even and unlabored, no signs of restlessness noted. Safety and precautions maintained.         Darlyn Kang RN  10/12/22 [id number]

## 2022-10-12 NOTE — ED NOTES
Patient appears to be  sleeping respirations are even and unlabored no distress noted at this time.        Ernesto Huynh RN  10/12/22 2098

## 2022-10-12 NOTE — ED NOTES
Attempted to call [facility], on hold 10mins, will try again later     Mahogany Hussein, VAISHNAVI  10/12/22 [id number]

## 2022-10-12 NOTE — ED NOTES
Spoke with Keena RIGGINS from [facility], requested if we have something that shows insurance verified to fax to [facility] [phone]. Spoke with Registration informed the whole medicaid system is down and no insurance is able to be verified. It was however verified earlier, they will print me a verification as soon as they can.      Simran Vazquez RN  10/12/22 [id number]

## 2022-10-13 ASSESSMENT — ENCOUNTER SYMPTOMS
VOICE CHANGE: 0
NAUSEA: 0
BACK PAIN: 1
VOMITING: 0
ANAL BLEEDING: 0
EYE DISCHARGE: 0
ABDOMINAL DISTENTION: 0
APNEA: 0

## 2022-10-15 LAB
EKG ATRIAL RATE: 86 BPM
EKG P AXIS: 13 DEGREES
EKG P-R INTERVAL: 158 MS
EKG Q-T INTERVAL: 366 MS
EKG QRS DURATION: 88 MS
EKG QTC CALCULATION (BAZETT): 437 MS
EKG R AXIS: 56 DEGREES
EKG T AXIS: 16 DEGREES
EKG VENTRICULAR RATE: 86 BPM